# Patient Record
Sex: FEMALE | Race: WHITE | Employment: UNEMPLOYED | URBAN - METROPOLITAN AREA
[De-identification: names, ages, dates, MRNs, and addresses within clinical notes are randomized per-mention and may not be internally consistent; named-entity substitution may affect disease eponyms.]

---
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
## 2019-06-06 ENCOUNTER — OFFICE VISIT (OUTPATIENT)
Dept: FAMILY MEDICINE CLINIC | Age: 19
End: 2019-06-06

## 2019-06-06 VITALS
SYSTOLIC BLOOD PRESSURE: 128 MMHG | TEMPERATURE: 98.2 F | RESPIRATION RATE: 18 BRPM | BODY MASS INDEX: 24.62 KG/M2 | WEIGHT: 153.2 LBS | OXYGEN SATURATION: 100 % | HEART RATE: 80 BPM | HEIGHT: 66 IN | DIASTOLIC BLOOD PRESSURE: 88 MMHG

## 2019-06-06 DIAGNOSIS — F41.9 ANXIETY AND DEPRESSION: Primary | ICD-10-CM

## 2019-06-06 DIAGNOSIS — F41.0 PANIC ATTACK: ICD-10-CM

## 2019-06-06 DIAGNOSIS — M79.10 MYALGIA: ICD-10-CM

## 2019-06-06 DIAGNOSIS — F32.A ANXIETY AND DEPRESSION: Primary | ICD-10-CM

## 2019-06-06 RX ORDER — BUSPIRONE HYDROCHLORIDE 10 MG/1
10 TABLET ORAL 2 TIMES DAILY
Qty: 60 TAB | Refills: 0 | Status: SHIPPED | OUTPATIENT
Start: 2019-06-06 | End: 2019-09-24

## 2019-06-06 RX ORDER — VENLAFAXINE HYDROCHLORIDE 37.5 MG/1
37.5 CAPSULE, EXTENDED RELEASE ORAL DAILY
Qty: 30 CAP | Refills: 0 | Status: SHIPPED | OUTPATIENT
Start: 2019-06-06 | End: 2019-09-13 | Stop reason: CLARIF

## 2019-06-06 NOTE — PROGRESS NOTES
HPI  Cruz Flores is a 22 y.o. female who presents today to establish care and for anxiety. Pt notes she has been having frequent panic attacks, daily for the past week and a half. Pt states she was diagnosed with depression and anxiety at age 13 and believe it is health related at that time she was dealing with some illness and had some hospitalization with septic uterus and bleeding disorder, kidney stones and surgical procedure for that. Pt also notes relationship break ups and issues also intermittent trigger her anxiety. Pt states she was treated in the past by her pediatrician, had a bad reaction to Zoloft, she \"felt like a zombie\". Pt states she had a recent increase in anxiety that started about 2 weeks ago, which stem from wisdom tooth infection she was treated with amoxicillin and had an allergic reaction which caused nausea/vomiting and rash. PHQ 9 Score: 22 (6/6/2019  3:00 PM)  BILLY-7 Score: 21    Current Outpatient Medications   Medication Sig Dispense Refill    norgestrel-ethinyl estradiol (LOW-OGESTREL, 28,) 0.3-30 mg-mcg tab Take 1 Tab by mouth daily.  oxyCODONE-acetaminophen (PERCOCET) 5-325 mg per tablet Take 1 Tab by mouth every four (4) hours as needed for Pain. Max Daily Amount: 6 Tabs. 16 Tab 0    magnesium 250 mg tab Take 1 Tab by mouth daily.  citalopram (CELEXA) 40 mg tablet Take 40 mg by mouth daily.  multivitamin with iron tablet Take 1 Tab by mouth daily.  melatonin 3 mg tablet Take  by mouth.  acetaminophen (TYLENOL) 325 mg tablet Take 650 mg by mouth every four (4) hours as needed. Allergies   Allergen Reactions    Amoxicillin Hives    Morphine Other (comments)     flushing    Nsaids (Non-Steroidal Anti-Inflammatory Drug) Other (comments)     Denies this allergy         SUBJECTIVE:  Review of Systems   Constitutional: Negative for fever and malaise/fatigue. Eyes: Negative for blurred vision. Respiratory: Negative for shortness of breath. Cardiovascular: Negative for chest pain, palpitations and leg swelling. Gastrointestinal: Negative for abdominal pain, diarrhea, nausea and vomiting. Genitourinary: Negative for dysuria, frequency and urgency. Musculoskeletal: Positive for myalgias. Skin: Positive for itching and rash. Neurological: Negative for dizziness, tingling, sensory change and headaches. Psychiatric/Behavioral: Negative for depression and suicidal ideas. The patient is nervous/anxious and has insomnia. OBJECTIVE:  Visit Vitals  /88 (BP 1 Location: Left arm, BP Patient Position: Sitting)   Pulse 80   Temp 98.2 °F (36.8 °C) (Oral)   Resp 18   Ht 5' 6\" (1.676 m)   Wt 153 lb 3.2 oz (69.5 kg)   LMP  (LMP Unknown)   SpO2 100%   BMI 24.73 kg/m²        Physical Exam   Constitutional: She is oriented to person, place, and time and well-developed, well-nourished, and in no distress. HENT:   Head: Normocephalic. Eyes: Pupils are equal, round, and reactive to light. Conjunctivae and EOM are normal.   Neck: Normal range of motion. Neck supple. No thyromegaly present. Cardiovascular: Normal rate, regular rhythm and normal heart sounds. Pulmonary/Chest: Effort normal and breath sounds normal. She has no wheezes. She has no rales. She exhibits no tenderness. Musculoskeletal: She exhibits no edema. Neurological: She is alert and oriented to person, place, and time. Gait normal.   Skin: Skin is warm and dry. Psychiatric: Her mood appears anxious. She does not exhibit a depressed mood. She expresses no homicidal and no suicidal ideation. Nursing note and vitals reviewed. ASSESSMENT:  Diagnoses and all orders for this visit:    1. Anxiety and depression  -     REFERRAL TO PSYCHIATRY  -     venlafaxine-SR (EFFEXOR-XR) 37.5 mg capsule; Take 1 Cap by mouth daily. -     busPIRone (BUSPAR) 10 mg tablet; Take 1 Tab by mouth two (2) times a day.     2. Panic attack  -     venlafaxine-SR (EFFEXOR-XR) 37.5 mg capsule; Take 1 Cap by mouth daily. PLAN:  BILLY-7 & PHQ-9 today  Referral placed for psych  Start Buspar BID, start Effexor daily    I have discussed the diagnosis with the patient and the intended plan as seen in the above orders. The patient has received an after-visit summary and questions were answered concerning future plans. I have discussed medication side effects and warnings with the patient as well. Patient will call for further questions. Follow-up and Dispositions    · Return in about 1 month (around 7/4/2019) for follow up anxiety and myalgia.        Farhana Zavala NP

## 2019-06-06 NOTE — PROGRESS NOTES
Chief Complaint   Patient presents with    Depression    Anxiety    Establish Care     1. Have you been to the ER, urgent care clinic since your last visit? Hospitalized since your last visit? No    2. Have you seen or consulted any other health care providers outside of the [address] [place] [address] since your last visit? Include any pap smears or colon screening.  No

## 2019-06-06 NOTE — PATIENT INSTRUCTIONS

## 2019-06-07 LAB
A-G RATIO,AGRAT: 1.4 RATIO (ref 1.1–2.6)
ABSOLUTE LYMPHOCYTE COUNT, 10803: 2.6 K/UL (ref 1–4.8)
ALBUMIN SERPL-MCNC: 4.5 G/DL (ref 3.5–5)
ALP SERPL-CCNC: 82 U/L (ref 25–115)
ALT SERPL-CCNC: 61 U/L (ref 5–40)
ANION GAP SERPL CALC-SCNC: 14 MMOL/L
AST SERPL W P-5'-P-CCNC: 45 U/L (ref 10–37)
BASOPHILS # BLD: 0 K/UL (ref 0–0.2)
BASOPHILS NFR BLD: 0 % (ref 0–2)
BILIRUB SERPL-MCNC: 0.5 MG/DL (ref 0.2–1.2)
BUN SERPL-MCNC: 8 MG/DL (ref 6–22)
CALCIUM SERPL-MCNC: 9.6 MG/DL (ref 8.4–10.5)
CHLORIDE SERPL-SCNC: 103 MMOL/L (ref 98–110)
CO2 SERPL-SCNC: 23 MMOL/L (ref 20–32)
CREAT SERPL-MCNC: 0.6 MG/DL (ref 0.5–1.2)
EOSINOPHIL # BLD: 0.1 K/UL (ref 0–0.5)
EOSINOPHIL NFR BLD: 1 % (ref 0–6)
ERYTHROCYTE [DISTWIDTH] IN BLOOD BY AUTOMATED COUNT: 12.6 % (ref 10–15.5)
GFRAA, 66117: >60
GFRNA, 66118: >60
GLOBULIN,GLOB: 3.3 G/DL (ref 2–4)
GLUCOSE SERPL-MCNC: 81 MG/DL (ref 70–99)
GRANULOCYTES,GRANS: 58 % (ref 40–75)
HCT VFR BLD AUTO: 43.3 % (ref 35.1–46.5)
HGB BLD-MCNC: 13.6 G/DL (ref 11.7–15.5)
LYMPHOCYTES, LYMLT: 31 % (ref 20–45)
MCH RBC QN AUTO: 30 PG (ref 26–34)
MCHC RBC AUTO-ENTMCNC: 31 G/DL (ref 31–36)
MCV RBC AUTO: 95 FL (ref 80–95)
MONOCYTES # BLD: 0.8 K/UL (ref 0.1–1)
MONOCYTES NFR BLD: 10 % (ref 3–12)
NEUTROPHILS # BLD AUTO: 4.8 K/UL (ref 1.8–7.7)
PLATELET # BLD AUTO: 301 K/UL (ref 140–440)
PMV BLD AUTO: 11.4 FL (ref 9–13)
POTASSIUM SERPL-SCNC: 4.3 MMOL/L (ref 3.5–5.5)
PROT SERPL-MCNC: 7.8 G/DL (ref 6.4–8.3)
RBC # BLD AUTO: 4.56 M/UL (ref 3.8–5.2)
SODIUM SERPL-SCNC: 140 MMOL/L (ref 133–145)
TSH SERPL DL<=0.005 MIU/L-ACNC: 1.56 MCU/ML (ref 0.27–4.2)
WBC # BLD AUTO: 8.2 K/UL (ref 4–11)

## 2019-06-18 ENCOUNTER — OFFICE VISIT (OUTPATIENT)
Dept: FAMILY MEDICINE CLINIC | Facility: CLINIC | Age: 19
End: 2019-06-18

## 2019-06-18 VITALS
TEMPERATURE: 98.1 F | WEIGHT: 151 LBS | HEIGHT: 66 IN | RESPIRATION RATE: 20 BRPM | OXYGEN SATURATION: 99 % | HEART RATE: 60 BPM | SYSTOLIC BLOOD PRESSURE: 121 MMHG | DIASTOLIC BLOOD PRESSURE: 83 MMHG | BODY MASS INDEX: 24.27 KG/M2

## 2019-06-18 DIAGNOSIS — Z30.41 ENCOUNTER FOR SURVEILLANCE OF CONTRACEPTIVE PILLS: ICD-10-CM

## 2019-06-18 DIAGNOSIS — F41.9 ANXIETY: Primary | ICD-10-CM

## 2019-06-18 RX ORDER — DIPHENHYDRAMINE HCL 25 MG
25 CAPSULE ORAL
COMMUNITY
End: 2022-03-04 | Stop reason: ALTCHOICE

## 2019-06-18 NOTE — PROGRESS NOTES
06/18/19  9:38 AM  had concerns including Anxiety (Room 8  ). HISTORY OF PRESENT ILLNESS   This is a 22 y.o. female who presents for acute care. Her last visit with family practice was in June 6, 2019. Infected wisdom tooth  She was given Amoxil for infected wisdom tooth. She took it for 4 days. She developed a rash at that time. Anxiety   Effexor caused insomnia. Buspar increased heart palpitations. She has been on Fluoxetine Sertraline  She is scheduled for counseling and therapy. Current Outpatient Medications:     diphenhydrAMINE (BENADRYL) 25 mg capsule, Take 25 mg by mouth every six (6) hours as needed. , Disp: , Rfl:     norgestrel-ethinyl estradiol (LOW-OGESTREL, 28,) 0.3-30 mg-mcg tab, Take 1 Tab by mouth daily. , Disp: , Rfl:     multivitamin with iron tablet, Take 1 Tab by mouth daily. , Disp: , Rfl:     acetaminophen (TYLENOL) 325 mg tablet, Take 650 mg by mouth every four (4) hours as needed. , Disp: , Rfl:     venlafaxine-SR (EFFEXOR-XR) 37.5 mg capsule, Take 1 Cap by mouth daily. , Disp: 30 Cap, Rfl: 0    busPIRone (BUSPAR) 10 mg tablet, Take 1 Tab by mouth two (2) times a day., Disp: 60 Tab, Rfl: 0    oxyCODONE-acetaminophen (PERCOCET) 5-325 mg per tablet, Take 1 Tab by mouth every four (4) hours as needed for Pain. Max Daily Amount: 6 Tabs., Disp: 16 Tab, Rfl: 0    magnesium 250 mg tab, Take 1 Tab by mouth daily. , Disp: , Rfl:     melatonin 3 mg tablet, Take  by mouth., Disp: , Rfl:     Allergies   Allergen Reactions    Amoxicillin Hives    Morphine Other (comments)     flushing    Nsaids (Non-Steroidal Anti-Inflammatory Drug) Other (comments)     Denies this allergy         Active Ambulatory Problems     Diagnosis Date Noted    Von Willebrand disease ([facility] [address] 04/10/2014    Kidney stone 02/22/2017     Resolved Ambulatory Problems     Diagnosis Date Noted    Anemia due to blood loss 11/26/2012    Abnormal uterine bleeding 11/26/2012    Dermoid cyst 04/10/2014     Past Medical History:   Diagnosis Date    ADHD (attention deficit hyperactivity disorder)     Adverse effect of anesthesia 2015    Anemia     Bicornate uterus     GERD (gastroesophageal reflux disease)     Kidney stones     Pelvic mass     Unspecified adverse effect of anesthesia     Von Willebrand disease ([facility] [address]        Review of Systems   Constitutional: Negative for chills, fever, malaise/fatigue and weight loss. HENT: Negative for congestion, ear pain, hearing loss, sinus pain, sore throat and tinnitus. Eyes: Negative for blurred vision, double vision, photophobia and pain. Respiratory: Negative for cough, shortness of breath and wheezing. Cardiovascular: Negative for chest pain, palpitations and orthopnea. Gastrointestinal: Negative for abdominal pain, constipation, diarrhea, heartburn and nausea. Genitourinary: Negative for dysuria, frequency and urgency. Musculoskeletal: Negative for back pain, joint pain, myalgias and neck pain. Skin: Negative. Neurological: Negative for dizziness, sensory change, speech change, focal weakness, weakness and headaches. Endo/Heme/Allergies: Negative for polydipsia. Does not bruise/bleed easily. Psychiatric/Behavioral: Negative for depression. The patient is not nervous/anxious and does not have insomnia. Results for orders placed or performed in visit on 06/06/19   TSH 3RD GENERATION   Result Value Ref Range    TSH 1.56 0.27 - 6.16 mcU/mL   METABOLIC PANEL, COMPREHENSIVE   Result Value Ref Range    Glucose 81 70 - 99 mg/dL    BUN 8 6 - 22 mg/dL    Creatinine 0.6 0.5 - 1.2 mg/dL    Sodium 140 133 - 145 mmol/L    Potassium 4.3 3.5 - 5.5 mmol/L    Chloride 103 98 - 110 mmol/L    CO2 23 20 - 32 mmol/L    AST (SGOT) 45 (H) 10 - 37 U/L    ALT (SGPT) 61 (H) 5 - 40 U/L    Alk.  phosphatase 82 25 - 115 U/L    Bilirubin, total 0.5 0.2 - 1.2 mg/dL    Calcium 9.6 8.4 - 10.5 mg/dL    Protein, total 7.8 6.4 - 8.3 g/dL    Albumin 4.5 3.5 - 5.0 g/dL    A-G Ratio 1.4 1.1 - 2.6 ratio    Globulin 3.3 2.0 - 4.0 g/dL    Anion gap 14.0 mmol/L    GFRAA >60.0 >60.0    GFRNA >60.0 >60.0   CBC WITH AUTOMATED DIFF   Result Value Ref Range    WBC 8.2 4.0 - 11.0 K/uL    RBC 4.56 3.80 - 5.20 M/uL    HGB 13.6 11.7 - 15.5 g/dL    HCT 43.3 35.1 - 46.5 %    MCV 95 80 - 95 fL    MCH 30 26 - 34 pg    MCHC 31 31 - 36 g/dL    RDW 12.6 10.0 - 15.5 %    PLATELET 463 294 - 163 K/uL    MPV 11.4 9.0 - 13.0 fL    NEUTROPHILS 58 40 - 75 %    Lymphocytes 31 20 - 45 %    MONOCYTES 10 3 - 12 %    EOSINOPHILS 1 0 - 6 %    BASOPHILS 0 0 - 2 %    ABS. NEUTROPHILS 4.8 1.8 - 7.7 K/uL    ABSOLUTE LYMPHOCYTE COUNT 2.6 1.0 - 4.8 K/uL    ABS. MONOCYTES 0.8 0.1 - 1.0 K/uL    ABS. EOSINOPHILS 0.1 0.0 - 0.5 K/uL    ABS. BASOPHILS 0.0 0.0 - 0.2 K/uL       Visit Vitals  /83   Pulse 60   Temp 98.1 °F (36.7 °C) (Oral)   Resp 20   Ht 5' 6\" (1.676 m)   Wt 151 lb (68.5 kg)   SpO2 99%   BMI 24.37 kg/m²       Physical Exam   Constitutional: She is oriented to person, place, and time. She appears well-developed and well-nourished. HENT:   Head: Normocephalic and atraumatic. Right Ear: External ear normal.   Left Ear: External ear normal.   Mouth/Throat: Oropharynx is clear and moist.   Eyes: Pupils are equal, round, and reactive to light. Conjunctivae and EOM are normal. No scleral icterus. Neck: No JVD present. No thyromegaly present. Cardiovascular: Normal rate, regular rhythm, normal heart sounds and intact distal pulses. Pulmonary/Chest: Effort normal and breath sounds normal.   Abdominal: Bowel sounds are normal. She exhibits no distension. There is no tenderness. Musculoskeletal: Normal range of motion. She exhibits no edema or deformity. Lymphadenopathy:     She has no cervical adenopathy. Neurological: She is alert and oriented to person, place, and time. No cranial nerve deficit or sensory deficit. She exhibits normal muscle tone. Coordination normal.   Skin: Skin is dry.  Capillary refill takes less than 2 seconds. No rash noted. No erythema. No pallor. Psychiatric:   Situational anxiety noted. 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 ASSESSMENT and PLAN    ICD-10-CM ICD-9-CM    1. Anxiety F41.9 300.00     Would benefit from cognitive behavioral therapy   2. Encounter for surveillance of contraceptive pills Z30.41 V25.41 DISCONTINUED: norgestrel-ethinyl estradiol (LOW-OGESTREL, 28,) 0.3-30 mg-mcg tab    Refill given       Diagnoses and all orders for this visit:    1. Anxiety  Comments:  Would benefit from cognitive behavioral therapy    2.  Encounter for surveillance of contraceptive pills  Comments:  Refill given

## 2019-06-18 NOTE — PATIENT INSTRUCTIONS

## 2019-07-09 ENCOUNTER — OFFICE VISIT (OUTPATIENT)
Dept: FAMILY MEDICINE CLINIC | Facility: CLINIC | Age: 19
End: 2019-07-09

## 2019-07-09 VITALS
WEIGHT: 154.4 LBS | BODY MASS INDEX: 24.81 KG/M2 | SYSTOLIC BLOOD PRESSURE: 126 MMHG | HEIGHT: 66 IN | RESPIRATION RATE: 12 BRPM | DIASTOLIC BLOOD PRESSURE: 84 MMHG | HEART RATE: 69 BPM | TEMPERATURE: 97.4 F | OXYGEN SATURATION: 99 %

## 2019-07-09 DIAGNOSIS — R74.8 ELEVATED LIVER ENZYMES: Primary | ICD-10-CM

## 2019-07-09 DIAGNOSIS — F41.9 ANXIETY AND DEPRESSION: ICD-10-CM

## 2019-07-09 DIAGNOSIS — F32.A ANXIETY AND DEPRESSION: ICD-10-CM

## 2019-07-09 DIAGNOSIS — F41.0 PANIC ATTACK: ICD-10-CM

## 2019-07-09 NOTE — PROGRESS NOTES
Kimberly Tucker is a 22 y.o. female presenting today for Establish Care and Anxiety    HPI:  Kimberly Tucker presents to the office today establish care with the practice. Patient presents today noting that she had labs which show she had elevated liver enzymes. Patient also notes that she has a history of anxiety ADHD, anemia, kidney stones, GERD, von Willebrand disease and panic attacks. Patient is currently being followed by psychiatric services. She notes that she has taken Effexor and BuSpar daily. Patient also presents today without any complaints of chest pain, palpitation or lower extremity edema. She denies any cough, wheezing or congestion. Review of Systems   Respiratory: Negative for cough. Cardiovascular: Negative for chest pain and palpitations. Gastrointestinal: Negative for abdominal pain, nausea and vomiting. Neurological: Negative for dizziness and headaches. Allergies   Allergen Reactions    Amoxicillin Hives    Morphine Other (comments)     Denies allergy      Nsaids (Non-Steroidal Anti-Inflammatory Drug) Other (comments)       Current Outpatient Medications   Medication Sig Dispense Refill    LOW-OGESTREL, 28, 0.3-30 mg-mcg tab TAKE 1 TABLET BY MOUTH DAILY 84 Tab 6    diphenhydrAMINE (BENADRYL) 25 mg capsule Take 25 mg by mouth every six (6) hours as needed.  multivitamin with iron tablet Take 1 Tab by mouth daily.  acetaminophen (TYLENOL) 325 mg tablet Take 650 mg by mouth every four (4) hours as needed.  venlafaxine-SR (EFFEXOR-XR) 37.5 mg capsule Take 1 Cap by mouth daily. 30 Cap 0    busPIRone (BUSPAR) 10 mg tablet Take 1 Tab by mouth two (2) times a day. 60 Tab 0    oxyCODONE-acetaminophen (PERCOCET) 5-325 mg per tablet Take 1 Tab by mouth every four (4) hours as needed for Pain. Max Daily Amount: 6 Tabs. 16 Tab 0    magnesium 250 mg tab Take 1 Tab by mouth daily.  melatonin 3 mg tablet Take  by mouth.          Past Medical History:   Diagnosis Date    ADHD (attention deficit hyperactivity disorder)     Adverse effect of anesthesia 2015    dermoid cyst on right ovary (woke up had some hives after general anesthesia)    Anemia     Bicornate uterus     GERD (gastroesophageal reflux disease)     Kidney stones     Pelvic mass     Unspecified adverse effect of anesthesia     hives with general anethesia    Von Willebrand disease ([facility] [address]     she does not have per mother       Past Surgical History:   Procedure Laterality Date    HX COLONOSCOPY      HX LITHOTRIPSY  2015    HX OTHER SURGICAL      BIRTH SHIRA REMOVED FROM BUTTOCKS @ 6WEEKS OLD SX.    HX OTHER SURGICAL  2015    dermoid cyst removed       Social History     Socioeconomic History    Marital status: SINGLE     Spouse name: Not on file    Number of children: Not on file    Years of education: Not on file    Highest education level: Not on file   Occupational History    Not on file   Social Needs    Financial resource strain: Not on file    Food insecurity:     Worry: Not on file     Inability: Not on file    Transportation needs:     Medical: Not on file     Non-medical: Not on file   Tobacco Use    Smoking status: Current Every Day Smoker     Packs/day: 0.50    Smokeless tobacco: Never Used   Substance and Sexual Activity    Alcohol use:  Yes     Alcohol/week: 2.0 standard drinks     Types: 2 Glasses of wine per week    Drug use: Yes     Types: Marijuana    Sexual activity: Yes     Partners: Male     Birth control/protection: Condom   Lifestyle    Physical activity:     Days per week: Not on file     Minutes per session: Not on file    Stress: Not on file   Relationships    Social connections:     Talks on phone: Not on file     Gets together: Not on file     Attends Hinduism service: Not on file     Active member of club or organization: Not on file     Attends meetings of clubs or organizations: Not on file     Relationship status: Not on file    Intimate partner violence: Fear of current or ex partner: Not on file     Emotionally abused: Not on file     Physically abused: Not on file     Forced sexual activity: Not on file   Other Topics Concern    Not on file   Social History Narrative    Not on file       Patient does not have an advanced directive on file    Vitals:    07/09/19 1324   BP: 126/84   Pulse: 69   Resp: 12   Temp: 97.4 °F (36.3 °C)   TempSrc: Oral   SpO2: 99%   Weight: 154 lb 6.4 oz (70 kg)   Height: 5' 6\" (1.676 m)   PainSc:   0 - No pain       Physical Exam   Constitutional: No distress. Cardiovascular: Normal rate, regular rhythm and normal heart sounds. Pulmonary/Chest: Effort normal and breath sounds normal.   Abdominal: Soft. Bowel sounds are normal.   Neurological: She is alert. Skin: Skin is warm. Nursing note and vitals reviewed. Office Visit on 07/09/2019   Component Date Value Ref Range Status    Glucose 07/09/2019 75  70 - 99 mg/dL Final    BUN 07/09/2019 10  6 - 22 mg/dL Final    Creatinine 07/09/2019 0.6  0.5 - 1.2 mg/dL Final    Sodium 07/09/2019 140  133 - 145 mmol/L Final    Potassium 07/09/2019 4.4  3.5 - 5.5 mmol/L Final    Chloride 07/09/2019 102  98 - 110 mmol/L Final    CO2 07/09/2019 26  20 - 32 mmol/L Final    AST (SGOT) 07/09/2019 21  10 - 37 U/L Final    ALT (SGPT) 07/09/2019 21  5 - 40 U/L Final    Alk. phosphatase 07/09/2019 73  25 - 115 U/L Final    Bilirubin, total 07/09/2019 0.5  0.2 - 1.2 mg/dL Final    Calcium 07/09/2019 9.6  8.4 - 10.5 mg/dL Final    Protein, total 07/09/2019 7.5  6.4 - 8.3 g/dL Final    Albumin 07/09/2019 4.4  3.5 - 5.0 g/dL Final    A-G Ratio 07/09/2019 1.4  1.1 - 2.6 ratio Final    Globulin 07/09/2019 3.1  2.0 - 4.0 g/dL Final    Anion gap 07/09/2019 12.0  mmol/L Final    Comment: Test includes Albumin, Alkaline Phosphatase, ALT, AST, BUN, Calcium, CO2,  Chloride, Creatinine, Glucose, Potassium, Sodium, Total Bilirubin and Total  Protein.   Estimated GFR results are reported in mL/min/1.73 sq.m. by the MDRD equation. This eGFR is validated for stable chronic renal failure patients. This   equation  is unreliable in acute illness or patients with normal renal function.  GFRAA 07/09/2019 >60.0  >60.0 Final    GFRNA 07/09/2019 >60.0  >60.0 Final   Office Visit on 06/06/2019   Component Date Value Ref Range Status    TSH 06/06/2019 1.56  0.27 - 4.20 mcU/mL Final    Glucose 06/06/2019 81  70 - 99 mg/dL Final    BUN 06/06/2019 8  6 - 22 mg/dL Final    Creatinine 06/06/2019 0.6  0.5 - 1.2 mg/dL Final    Sodium 06/06/2019 140  133 - 145 mmol/L Final    Potassium 06/06/2019 4.3  3.5 - 5.5 mmol/L Final    Chloride 06/06/2019 103  98 - 110 mmol/L Final    CO2 06/06/2019 23  20 - 32 mmol/L Final    AST (SGOT) 06/06/2019 45* 10 - 37 U/L Final    ALT (SGPT) 06/06/2019 61* 5 - 40 U/L Final    Alk. phosphatase 06/06/2019 82  25 - 115 U/L Final    Bilirubin, total 06/06/2019 0.5  0.2 - 1.2 mg/dL Final    Calcium 06/06/2019 9.6  8.4 - 10.5 mg/dL Final    Protein, total 06/06/2019 7.8  6.4 - 8.3 g/dL Final    Albumin 06/06/2019 4.5  3.5 - 5.0 g/dL Final    A-G Ratio 06/06/2019 1.4  1.1 - 2.6 ratio Final    Globulin 06/06/2019 3.3  2.0 - 4.0 g/dL Final    Anion gap 06/06/2019 14.0  mmol/L Final    Comment: Test includes Albumin, Alkaline Phosphatase, ALT, AST, BUN, Calcium, CO2,  Chloride, Creatinine, Glucose, Potassium, Sodium, Total Bilirubin and Total  Protein. Estimated GFR results are reported in mL/min/1.73 sq.m. by the MDRD equation. This eGFR is validated for stable chronic renal failure patients. This   equation  is unreliable in acute illness or patients with normal renal function.       GFRAA 06/06/2019 >60.0  >60.0 Final    GFRNA 06/06/2019 >60.0  >60.0 Final    WBC 06/06/2019 8.2  4.0 - 11.0 K/uL Final    RBC 06/06/2019 4.56  3.80 - 5.20 M/uL Final    HGB 06/06/2019 13.6  11.7 - 15.5 g/dL Final    HCT 06/06/2019 43.3  35.1 - 46.5 % Final    MCV 06/06/2019 95  80 - 95 fL Final    MCH 06/06/2019 30  26 - 34 pg Final    MCHC 06/06/2019 31  31 - 36 g/dL Final    RDW 06/06/2019 12.6  10.0 - 15.5 % Final    PLATELET 40/71/3403 388  140 - 440 K/uL Final    MPV 06/06/2019 11.4  9.0 - 13.0 fL Final    NEUTROPHILS 06/06/2019 58  40 - 75 % Final    Lymphocytes 06/06/2019 31  20 - 45 % Final    MONOCYTES 06/06/2019 10  3 - 12 % Final    EOSINOPHILS 06/06/2019 1  0 - 6 % Final    BASOPHILS 06/06/2019 0  0 - 2 % Final    ABS. NEUTROPHILS 06/06/2019 4.8  1.8 - 7.7 K/uL Final    ABSOLUTE LYMPHOCYTE COUNT 06/06/2019 2.6  1.0 - 4.8 K/uL Final    ABS. MONOCYTES 06/06/2019 0.8  0.1 - 1.0 K/uL Final    ABS. EOSINOPHILS 06/06/2019 0.1  0.0 - 0.5 K/uL Final    ABS. BASOPHILS 06/06/2019 0.0  0.0 - 0.2 K/uL Final       .  Results for orders placed or performed in visit on 96/60/89   METABOLIC PANEL, COMPREHENSIVE   Result Value Ref Range    Glucose 75 70 - 99 mg/dL    BUN 10 6 - 22 mg/dL    Creatinine 0.6 0.5 - 1.2 mg/dL    Sodium 140 133 - 145 mmol/L    Potassium 4.4 3.5 - 5.5 mmol/L    Chloride 102 98 - 110 mmol/L    CO2 26 20 - 32 mmol/L    AST (SGOT) 21 10 - 37 U/L    ALT (SGPT) 21 5 - 40 U/L    Alk. phosphatase 73 25 - 115 U/L    Bilirubin, total 0.5 0.2 - 1.2 mg/dL    Calcium 9.6 8.4 - 10.5 mg/dL    Protein, total 7.5 6.4 - 8.3 g/dL    Albumin 4.4 3.5 - 5.0 g/dL    A-G Ratio 1.4 1.1 - 2.6 ratio    Globulin 3.1 2.0 - 4.0 g/dL    Anion gap 12.0 mmol/L    GFRAA >60.0 >60.0    GFRNA >60.0 >60.0    Narrative    Unless additionally indicated, test performed at: [facility], [address] [place] [address]. PH: [phone]. Assessment / Plan:      ICD-10-CM ICD-9-CM    1. Elevated liver enzymes H59.0 093.8 METABOLIC PANEL, COMPREHENSIVE      METABOLIC PANEL, COMPREHENSIVE   2. Panic attack F41.0 300.01    3.  Anxiety and depression F41.9 300.00     N92.6 889      Metabolic panel ordered for elevated liver enzymes      I asked the patient if she  had any questions and answered her  questions. The patient stated that she understands the treatment plan and agrees with the treatment plan    This document was created with a voice activated dictation system and may contain transcription errors.

## 2019-07-09 NOTE — PROGRESS NOTES
ROOM # 2    Tavo Maldonado presents today for   Chief Complaint   Patient presents with    Establish Care    Anxiety       Tavo Maldonado preferred language for health care discussion is english/other. Is someone accompanying this pt? no    Is the patient using any DME equipment during [address]? no    Depression Screening:  3 most recent PHQ Screens 6/18/2019 6/6/2019   Little interest or pleasure in doing things More than half the days More than half the days   Feeling down, depressed, irritable, or hopeless Several days Nearly every day   Total Score PHQ 2 3 5   Trouble falling or staying asleep, or sleeping too much - Nearly every day   Feeling tired or having little energy - Nearly every day   Poor appetite, weight loss, or overeating - More than half the days   Feeling bad about yourself - or that you are a failure or have let yourself or your family down - More than half the days   Trouble concentrating on things such as school, work, reading, or watching TV - Nearly every day   Moving or speaking so slowly that other people could have noticed; or the opposite being so fidgety that others notice - Nearly every day   Thoughts of being better off dead, or hurting yourself in some way - Several days   PHQ 9 Score - 22   How difficult have these problems made it for you to do your work, take care of your home and get along with others - Very difficult       Learning Assessment:  Learning Assessment 6/6/2019   PRIMARY LEARNER Patient   HIGHEST LEVEL OF EDUCATION - PRIMARY LEARNER  SOME COLLEGE   BARRIERS PRIMARY LEARNER NONE   PRIMARY LANGUAGE ENGLISH   LEARNER PREFERENCE PRIMARY DEMONSTRATION   ANSWERED BY self   RELATIONSHIP SELF       Abuse Screening:  No flowsheet data found. Fall Risk  No flowsheet data found. Health Maintenance reviewed and discussed per provider.  Yes    Tavo Maldonado is due for   Health Maintenance Due   Topic Date Due    Pneumococcal 0-64 years (1 of 1 - PPSV23) 05/27/2004    HPV Age 9Y-26Y (1 - Female 3-dose series) 05/27/2013    DTaP/Tdap/Td series (1 - Tdap) 05/27/2019    PAP AKA CERVICAL CYTOLOGY  05/27/2019         Please order/place referral if appropriate. Advance Directive:  1. Do you have an advance directive in place? Patient Reply: no    2. If not, would you like material regarding how to put one in place? Patient Reply: no    Coordination of Care:  1. Have you been to the ER, urgent care clinic since your last visit? Hospitalized since your last visit? no    2. Have you seen or consulted any other health care providers outside of the [address] [place] [address] since your last visit? Include any pap smears or colon screening.  no

## 2019-07-10 LAB
A-G RATIO,AGRAT: 1.4 RATIO (ref 1.1–2.6)
ALBUMIN SERPL-MCNC: 4.4 G/DL (ref 3.5–5)
ALP SERPL-CCNC: 73 U/L (ref 25–115)
ALT SERPL-CCNC: 21 U/L (ref 5–40)
ANION GAP SERPL CALC-SCNC: 12 MMOL/L
AST SERPL W P-5'-P-CCNC: 21 U/L (ref 10–37)
BILIRUB SERPL-MCNC: 0.5 MG/DL (ref 0.2–1.2)
BUN SERPL-MCNC: 10 MG/DL (ref 6–22)
CALCIUM SERPL-MCNC: 9.6 MG/DL (ref 8.4–10.5)
CHLORIDE SERPL-SCNC: 102 MMOL/L (ref 98–110)
CO2 SERPL-SCNC: 26 MMOL/L (ref 20–32)
CREAT SERPL-MCNC: 0.6 MG/DL (ref 0.5–1.2)
GFRAA, 66117: >60
GFRNA, 66118: >60
GLOBULIN,GLOB: 3.1 G/DL (ref 2–4)
GLUCOSE SERPL-MCNC: 75 MG/DL (ref 70–99)
POTASSIUM SERPL-SCNC: 4.4 MMOL/L (ref 3.5–5.5)
PROT SERPL-MCNC: 7.5 G/DL (ref 6.4–8.3)
SODIUM SERPL-SCNC: 140 MMOL/L (ref 133–145)

## 2019-09-13 ENCOUNTER — OFFICE VISIT (OUTPATIENT)
Dept: FAMILY MEDICINE CLINIC | Facility: CLINIC | Age: 19
End: 2019-09-13

## 2019-09-13 VITALS
WEIGHT: 151 LBS | TEMPERATURE: 98.9 F | SYSTOLIC BLOOD PRESSURE: 137 MMHG | RESPIRATION RATE: 12 BRPM | DIASTOLIC BLOOD PRESSURE: 99 MMHG | HEIGHT: 66 IN | OXYGEN SATURATION: 100 % | BODY MASS INDEX: 24.27 KG/M2 | HEART RATE: 95 BPM

## 2019-09-13 DIAGNOSIS — R00.2 PALPITATIONS: ICD-10-CM

## 2019-09-13 DIAGNOSIS — F41.9 ANXIETY: ICD-10-CM

## 2019-09-13 DIAGNOSIS — F41.9 ANXIETY: Primary | ICD-10-CM

## 2019-09-13 DIAGNOSIS — Z23 ENCOUNTER FOR IMMUNIZATION: ICD-10-CM

## 2019-09-13 RX ORDER — SERTRALINE HYDROCHLORIDE 50 MG/1
50 TABLET, FILM COATED ORAL DAILY
Qty: 30 TAB | Refills: 2 | Status: SHIPPED | OUTPATIENT
Start: 2019-09-13 | End: 2019-09-13 | Stop reason: SDUPTHER

## 2019-09-13 RX ORDER — PROPRANOLOL HYDROCHLORIDE 10 MG/1
10 TABLET ORAL 2 TIMES DAILY
Qty: 60 TAB | Refills: 1 | Status: SHIPPED | OUTPATIENT
Start: 2019-09-13 | End: 2019-09-13 | Stop reason: SDUPTHER

## 2019-09-13 RX ORDER — SERTRALINE HYDROCHLORIDE 50 MG/1
TABLET, FILM COATED ORAL
Qty: 90 TAB | Refills: 2 | Status: SHIPPED | OUTPATIENT
Start: 2019-09-13 | End: 2019-10-08 | Stop reason: DRUGHIGH

## 2019-09-13 RX ORDER — SERTRALINE HYDROCHLORIDE 25 MG/1
TABLET, FILM COATED ORAL DAILY
COMMUNITY
End: 2019-09-13 | Stop reason: DRUGHIGH

## 2019-09-13 RX ORDER — PROPRANOLOL HYDROCHLORIDE 10 MG/1
TABLET ORAL
Qty: 180 TAB | Refills: 1 | Status: SHIPPED | OUTPATIENT
Start: 2019-09-13 | End: 2019-09-24 | Stop reason: DRUGHIGH

## 2019-09-13 NOTE — PROGRESS NOTES
Montserrat George presents today for   Chief Complaint   Patient presents with    Follow-up     patient stated that she been having panic attacks at least once a day    Chest Pain     tightness in the chest and numbness in arm statrted  August       Is someone accompanying this pt? no    Is the patient using any DME equipment during [address]? no    Depression Screening:  3 most recent PHQ Screens 9/13/2019   Little interest or pleasure in doing things Several days   Feeling down, depressed, irritable, or hopeless More than half the days   Total Score PHQ 2 3   Trouble falling or staying asleep, or sleeping too much Nearly every day   Feeling tired or having little energy More than half the days   Poor appetite, weight loss, or overeating More than half the days   Feeling bad about yourself - or that you are a failure or have let yourself or your family down Not at all   Trouble concentrating on things such as school, work, reading, or watching TV More than half the days   Moving or speaking so slowly that other people could have noticed; or the opposite being so fidgety that others notice Not at all   Thoughts of being better off dead, or hurting yourself in some way Not at all   PHQ 9 Score 12   How difficult have these problems made it for you to do your work, take care of your home and get along with others Somewhat difficult       Learning Assessment:  Learning Assessment 6/6/2019   PRIMARY LEARNER Patient   HIGHEST LEVEL OF EDUCATION - PRIMARY LEARNER  SOME COLLEGE   BARRIERS PRIMARY LEARNER NONE   PRIMARY LANGUAGE ENGLISH   LEARNER PREFERENCE PRIMARY DEMONSTRATION   ANSWERED BY self   RELATIONSHIP SELF       Abuse Screening:  No flowsheet data found. Fall Risk  No flowsheet data found. Health Maintenance reviewed and discussed and ordered per Provider.     Health Maintenance Due   Topic Date Due    Pneumococcal 0-64 years (1 of 1 - PPSV23) 05/27/2004    HPV Age 9Y-34Y (1 - Female 3-dose series) 05/27/2013    DTaP/Tdap/Td series (1 - Tdap) 05/27/2019    PAP AKA CERVICAL CYTOLOGY  05/27/2019    Influenza Age 9 to Adult  08/01/2019           Coordination of Care:  1. Have you been to the ER, urgent care clinic since your last visit? Hospitalized since your last visit? no    2. Have you seen or consulted any other health care providers outside of the [address] [place] [address] since your last visit? Include any pap smears or colon screening.  no

## 2019-09-13 NOTE — PROGRESS NOTES
Joanna Torres is a 22 y.o. female presenting today for Follow-up (patient stated that she been having panic attacks at least once a day) and Chest Pain (tightness in the chest and numbness in arm statrted  August)    HPI:  Joanna Torres presents to the office today for anxiety follow-up care. Patient notes over the last month she has been having palpations. She reports that she thinks her palpitations associated anxiety. He notes that she has been having panic attacks at least once a day. She notes that with the panic attacks he has chest tightness with numbness to the arm. She presents today with a blood pressure elevated 137/98. She denies any chest pain at this time. Review of Systems   Respiratory: Negative for cough and shortness of breath. Cardiovascular: Positive for palpitations. Negative for chest pain and leg swelling. Psychiatric/Behavioral: The patient is nervous/anxious. Allergies   Allergen Reactions    Amoxicillin Hives    Morphine Other (comments)     Denies allergy      Nsaids (Non-Steroidal Anti-Inflammatory Drug) Other (comments)       Current Outpatient Medications   Medication Sig Dispense Refill    LOW-OGESTREL, 28, 0.3-30 mg-mcg tab TAKE 1 TABLET BY MOUTH DAILY 84 Tab 6    diphenhydrAMINE (BENADRYL) 25 mg capsule Take 25 mg by mouth every six (6) hours as needed.  multivitamin with iron tablet Take 1 Tab by mouth daily.  propranolol LA (INDERAL LA) 60 mg SR capsule Take 1 Cap by mouth daily. 90 Cap 2    sertraline (ZOLOFT) 50 mg tablet TAKE 1 TABLET BY MOUTH DAILY 90 Tab 2    acetaminophen (TYLENOL) 325 mg tablet Take 650 mg by mouth every four (4) hours as needed.            Past Medical History:   Diagnosis Date    ADHD (attention deficit hyperactivity disorder)     Adverse effect of anesthesia 2015    dermoid cyst on right ovary (woke up had some hives after general anesthesia)    Anemia     Bicornate uterus     GERD (gastroesophageal reflux disease)  Kidney stones     Pelvic mass     Unspecified adverse effect of anesthesia     hives with general anethesia    Von Willebrand disease ([address]     she does not have per mother       Past Surgical History:   Procedure Laterality Date    HX COLONOSCOPY      HX LITHOTRIPSY  2015    HX OTHER SURGICAL      BIRTH SHIRA REMOVED FROM BUTTOCKS @ 6WEEKS OLD SX.    HX OTHER SURGICAL  2015    dermoid cyst removed       Social History     Socioeconomic History    Marital status: SINGLE     Spouse name: Not on file    Number of children: Not on file    Years of education: Not on file    Highest education level: Not on file   Occupational History    Not on file   Social Needs    Financial resource strain: Not on file    Food insecurity:     Worry: Not on file     Inability: Not on file    Transportation needs:     Medical: Not on file     Non-medical: Not on file   Tobacco Use    Smoking status: Former Smoker     Packs/day: 0.50     Last attempt to quit: 2019     Years since quittin.0    Smokeless tobacco: Current User    Tobacco comment: vape   Substance and Sexual Activity    Alcohol use:  Yes     Alcohol/week: 2.0 standard drinks     Types: 2 Glasses of wine per week    Drug use: Yes     Types: Marijuana    Sexual activity: Yes     Partners: Male     Birth control/protection: Condom   Lifestyle    Physical activity:     Days per week: Not on file     Minutes per session: Not on file    Stress: Not on file   Relationships    Social connections:     Talks on phone: Not on file     Gets together: Not on file     Attends Hinduism service: Not on file     Active member of club or organization: Not on file     Attends meetings of clubs or organizations: Not on file     Relationship status: Not on file    Intimate partner violence:     Fear of current or ex partner: Not on file     Emotionally abused: Not on file     Physically abused: Not on file     Forced sexual activity: Not on file   Other Topics Concern    Not on file   Social History Narrative    Not on file       Patient does not have an advanced directive on file    Vitals:    09/13/19 1448   BP: (!) 137/99   Pulse: 95   Resp: 12   Temp: 98.9 °F (37.2 °C)   TempSrc: Oral   SpO2: 100%   Weight: 151 lb (68.5 kg)   Height: 5' 6\" (1.676 m)   PainSc:   0 - No pain       Physical Exam   Constitutional: She is oriented to person, place, and time. No distress. Cardiovascular: Normal rate, regular rhythm and normal heart sounds. Pulmonary/Chest: Effort normal and breath sounds normal.   Neurological: She is alert and oriented to person, place, and time. Nursing note and vitals reviewed. Office Visit on 07/09/2019   Component Date Value Ref Range Status    Glucose 07/09/2019 75  70 - 99 mg/dL Final    BUN 07/09/2019 10  6 - 22 mg/dL Final    Creatinine 07/09/2019 0.6  0.5 - 1.2 mg/dL Final    Sodium 07/09/2019 140  133 - 145 mmol/L Final    Potassium 07/09/2019 4.4  3.5 - 5.5 mmol/L Final    Chloride 07/09/2019 102  98 - 110 mmol/L Final    CO2 07/09/2019 26  20 - 32 mmol/L Final    AST (SGOT) 07/09/2019 21  10 - 37 U/L Final    ALT (SGPT) 07/09/2019 21  5 - 40 U/L Final    Alk. phosphatase 07/09/2019 73  25 - 115 U/L Final    Bilirubin, total 07/09/2019 0.5  0.2 - 1.2 mg/dL Final    Calcium 07/09/2019 9.6  8.4 - 10.5 mg/dL Final    Protein, total 07/09/2019 7.5  6.4 - 8.3 g/dL Final    Albumin 07/09/2019 4.4  3.5 - 5.0 g/dL Final    A-G Ratio 07/09/2019 1.4  1.1 - 2.6 ratio Final    Globulin 07/09/2019 3.1  2.0 - 4.0 g/dL Final    Anion gap 07/09/2019 12.0  mmol/L Final    Comment: Test includes Albumin, Alkaline Phosphatase, ALT, AST, BUN, Calcium, CO2,  Chloride, Creatinine, Glucose, Potassium, Sodium, Total Bilirubin and Total  Protein. Estimated GFR results are reported in mL/min/1.73 sq.m. by the MDRD equation. This eGFR is validated for stable chronic renal failure patients.  This   equation  is unreliable in acute illness or patients with normal renal function.  GFRAA 07/09/2019 >60.0  >60.0 Final    GFRNA 07/09/2019 >60.0  >60.0 Final       .No results found for any visits on 09/13/19. Assessment / Plan:      ICD-10-CM ICD-9-CM    1. Anxiety F41.9 300.00 DISCONTINUED: sertraline (ZOLOFT) 50 mg tablet   2. Palpitations R00.2 785.1 AMB POC EKG ROUTINE W/ 12 LEADS, INTER & REP      DISCONTINUED: propranolol (INDERAL) 10 mg tablet   3. Encounter for immunization Z23 V03.89 INFLUENZA VIRUS VAC QUAD,SPLIT,PRESV FREE SYRINGE IM     EKG negative  Propranolol given  Follow-up in 1 month      Follow-up and Dispositions    · Return in about 4 weeks (around 10/11/2019), or if symptoms worsen or fail to improve. I asked the patient if she  had any questions and answered her  questions. The patient stated that she understands the treatment plan and agrees with the treatment plan    This document was created with a voice activated dictation system and may contain transcription errors.

## 2019-09-24 ENCOUNTER — OFFICE VISIT (OUTPATIENT)
Dept: CARDIOLOGY CLINIC | Age: 19
End: 2019-09-24

## 2019-09-24 VITALS
BODY MASS INDEX: 24.27 KG/M2 | DIASTOLIC BLOOD PRESSURE: 80 MMHG | HEIGHT: 66 IN | SYSTOLIC BLOOD PRESSURE: 130 MMHG | WEIGHT: 151 LBS | HEART RATE: 75 BPM

## 2019-09-24 DIAGNOSIS — R00.2 PALPITATION: Primary | ICD-10-CM

## 2019-09-24 DIAGNOSIS — F41.9 ANXIETY: ICD-10-CM

## 2019-09-24 DIAGNOSIS — R07.89 CHEST TIGHTNESS: ICD-10-CM

## 2019-09-24 DIAGNOSIS — Z72.0 TOBACCO ABUSE: ICD-10-CM

## 2019-09-24 DIAGNOSIS — R00.0 FAST HEART BEAT: ICD-10-CM

## 2019-09-24 RX ORDER — PROPRANOLOL HYDROCHLORIDE 60 MG/1
60 CAPSULE, EXTENDED RELEASE ORAL DAILY
Qty: 90 CAP | Refills: 2 | Status: SHIPPED | OUTPATIENT
Start: 2019-09-24 | End: 2019-10-29

## 2019-09-24 NOTE — PROGRESS NOTES
HISTORY OF PRESENT ILLNESS  Sri Kumar is a 22 y.o. female. Ms. Joyce Harrison presents with c/o palpitations. She reports palpitations and fast heart rate have been intermittent over the past month. She feels the sensation primarily when laying down, not with activity. New Patient   The history is provided by the patient and medical records. This is a recurrent problem. The current episode started more than 1 week ago. The problem occurs daily. The problem has been gradually worsening. Pertinent negatives include no chest pain and no shortness of breath. Nothing aggravates the symptoms. Nothing relieves the symptoms. Palpitations    The history is provided by the patient and medical records. This is a recurrent problem. The current episode started more than 1 week ago. The problem has been gradually worsening. The problem occurs daily. The problem is associated with anxiety, nicotine and caffeine. Pertinent negatives include no malaise/fatigue, no chest pain, no claudication, no orthopnea, no PND, no nausea, no vomiting, no dizziness, no cough and no shortness of breath.      Family History   Problem Relation Age of Onset    Heart Disease Maternal Grandfather     Heart Disease Paternal Grandmother     Diabetes Paternal Grandfather     No Known Problems Mother     No Known Problems Father     Heart Attack Paternal Uncle        Past Medical History:   Diagnosis Date    ADHD (attention deficit hyperactivity disorder)     Adverse effect of anesthesia 2015    dermoid cyst on right ovary (woke up had some hives after general anesthesia)    Anemia     Bicornate uterus     GERD (gastroesophageal reflux disease)     Kidney stones     Pelvic mass     Unspecified adverse effect of anesthesia     hives with general anethesia    Von Willebrand disease ([address]     she does not have per mother       Past Surgical History:   Procedure Laterality Date    HX COLONOSCOPY      HX LITHOTRIPSY  2015    HX OTHER SURGICAL BIRTH SHIRA REMOVED FROM BUTTOCKS @ 6WEEKS OLD SX.    HX OTHER SURGICAL  2015    dermoid cyst removed       Social History     Tobacco Use    Smoking status: Former Smoker     Packs/day: 0.50     Last attempt to quit: 2019     Years since quittin.0    Smokeless tobacco: Current User    Tobacco comment: vape   Substance Use Topics    Alcohol use: Yes     Alcohol/week: 2.0 standard drinks     Types: 2 Glasses of wine per week       Allergies   Allergen Reactions    Amoxicillin Hives    Morphine Other (comments)     Denies allergy      Nsaids (Non-Steroidal Anti-Inflammatory Drug) Other (comments)       Prior to Admission medications    Medication Sig Start Date End Date Taking? Authorizing Provider   propranolol LA (INDERAL LA) 60 mg SR capsule Take 1 Cap by mouth daily. 19  Yes Steve Velasco NP   sertraline (ZOLOFT) 50 mg tablet TAKE 1 TABLET BY MOUTH DAILY 19  Yes Bartolo BOYD NP   LOW-OGESTREL, 28, 0.3-30 mg-mcg tab TAKE 1 TABLET BY MOUTH DAILY 19  Yes Rhianna Bray MD   diphenhydrAMINE (BENADRYL) 25 mg capsule Take 25 mg by mouth every six (6) hours as needed. Yes Provider, Historical   multivitamin with iron tablet Take 1 Tab by mouth daily. Yes Provider, Historical   acetaminophen (TYLENOL) 325 mg tablet Take 650 mg by mouth every four (4) hours as needed. Yes Arcenio Gregory MD         Visit Vitals  /80   Pulse 75   Ht 5' 6\" (1.676 m)   Wt 68.5 kg (151 lb)   BMI 24.37 kg/m²       Review of Systems   Constitutional: Negative for malaise/fatigue. Respiratory: Negative for cough, shortness of breath and wheezing. Cardiovascular: Negative for chest pain, palpitations, orthopnea, claudication, leg swelling and PND. Gastrointestinal: Negative for nausea and vomiting. Musculoskeletal: Negative for falls. Neurological: Negative for dizziness. Endo/Heme/Allergies: Does not bruise/bleed easily.        Physical Exam   Constitutional: She is oriented to person, place, and time. She appears well-developed and well-nourished. Neck: No JVD present. Cardiovascular: Normal rate, regular rhythm, normal heart sounds and intact distal pulses. Exam reveals no gallop and no friction rub. No murmur heard. Pulmonary/Chest: Effort normal and breath sounds normal. No respiratory distress. She has no wheezes. She has no rales. She exhibits no tenderness. Abdominal: Soft. She exhibits no distension and no mass. There is no tenderness. Musculoskeletal: Normal range of motion. She exhibits no edema. Neurological: She is alert and oriented to person, place, and time. Skin: Skin is warm and dry. Psychiatric: She has a normal mood and affect. Nursing note and vitals reviewed. I have personally reviewed patients ekg done at other facility. I Have personally reviewed recent relevant labs available and discussed with patient    ASSESSMENT and PLAN     Ms. Daniel Payne has a reminder for a \"due or due soon\" health maintenance. I have asked that she contact her primary care provider for follow-up on this health maintenance. No flowsheet data found. Assessment         ICD-10-CM ICD-9-CM    1. Palpitation R00.2 785.1 CARDIAC EVENT MONITOR      propranolol LA (INDERAL LA) 60 mg SR capsule    Event monitor to evaluate for arrhythmias    2. Chest tightness R07.89 786.59     With fast heart rate - monitor   3. Anxiety F41.9 300.00     Continue Zoloft - follow with PCP   4. Fast heart beat R00.0 785. 0     Change to Propanolol LA 60 mg    5. Tobacco abuse Z72.0 305.1     Encouraged Vape cessation     9/2019 Ms. Daniel Payne c/o palpitations with fast heart rate and occasional chest tightness. Symptoms have been intermittent over the past month. Recent labs reviewed, TSH normal, EKG SR. Will obtain event monitor to r/o arrhythmias. Encouraged smoking cessation, decrease caffeine intake and limit alcohol to no more than 1 drink / day.   Palpitations are likely due to situational anxiety. Will d/c propranolol and begin long acting Propranolol 60 mg/ day. Discussed relaxation techniques such as yoga or exercise. Medications Discontinued During This Encounter   Medication Reason    magnesium 250 mg tab Therapy Completed    busPIRone (BUSPAR) 10 mg tablet     oxyCODONE-acetaminophen (PERCOCET) 5-325 mg per tablet     melatonin 3 mg tablet     propranolol (INDERAL) 10 mg tablet Dose Adjustment       Orders Placed This Encounter    propranolol LA (INDERAL LA) 60 mg SR capsule     Sig: Take 1 Cap by mouth daily. Dispense:  90 Cap     Refill:  2       Follow-up and Dispositions    · Return in about 6 weeks (around 11/5/2019). I have independently evaluated taken history and examined the patient. All relevant labs and testing data's are reviewed. Care plan discussed and updated after review.   Jacob Lewis MD

## 2019-09-24 NOTE — PATIENT INSTRUCTIONS

## 2019-10-08 ENCOUNTER — OFFICE VISIT (OUTPATIENT)
Dept: FAMILY MEDICINE CLINIC | Facility: CLINIC | Age: 19
End: 2019-10-08

## 2019-10-08 VITALS
BODY MASS INDEX: 23.95 KG/M2 | OXYGEN SATURATION: 99 % | SYSTOLIC BLOOD PRESSURE: 120 MMHG | TEMPERATURE: 98 F | HEART RATE: 73 BPM | DIASTOLIC BLOOD PRESSURE: 80 MMHG | RESPIRATION RATE: 12 BRPM | HEIGHT: 66 IN | WEIGHT: 149 LBS

## 2019-10-08 DIAGNOSIS — F41.9 ANXIETY: ICD-10-CM

## 2019-10-08 DIAGNOSIS — R00.2 PALPITATIONS: Primary | ICD-10-CM

## 2019-10-08 RX ORDER — SERTRALINE HYDROCHLORIDE 50 MG/1
TABLET, FILM COATED ORAL
Qty: 60 TAB | Refills: 2 | Status: SHIPPED | OUTPATIENT
Start: 2019-10-08 | End: 2020-05-05 | Stop reason: DRUGHIGH

## 2019-10-08 NOTE — PROGRESS NOTES
Visit Vitals  /86   Pulse 73   Temp 98 °F (36.7 °C) (Oral)   Resp 12   Ht 5' 6\" (1.676 m)   Wt 149 lb (67.6 kg)   SpO2 99%   BMI 24.05 kg/m²     Elie Martin presents today for   Chief Complaint   Patient presents with    Medication Evaluation     follow-up       Is someone accompanying this pt? no    Is the patient using any DME equipment during [facility]? no    Depression Screening:  3 most recent PHQ Screens 10/8/2019   Little interest or pleasure in doing things Not at all   Feeling down, depressed, irritable, or hopeless Not at all   Total Score PHQ 2 0   Trouble falling or staying asleep, or sleeping too much -   Feeling tired or having little energy -   Poor appetite, weight loss, or overeating -   Feeling bad about yourself - or that you are a failure or have let yourself or your family down -   Trouble concentrating on things such as school, work, reading, or watching TV -   Moving or speaking so slowly that other people could have noticed; or the opposite being so fidgety that others notice -   Thoughts of being better off dead, or hurting yourself in some way -   PHQ 9 Score -   How difficult have these problems made it for you to do your work, take care of your home and get along with others -       Learning Assessment:  Learning Assessment 6/6/2019   PRIMARY LEARNER Patient   HIGHEST LEVEL OF EDUCATION - PRIMARY LEARNER  SOME COLLEGE   BARRIERS PRIMARY LEARNER NONE   PRIMARY LANGUAGE ENGLISH   LEARNER PREFERENCE PRIMARY DEMONSTRATION   ANSWERED BY self   RELATIONSHIP SELF       Abuse Screening:  No flowsheet data found. Fall Risk  No flowsheet data found. Health Maintenance reviewed and discussed and ordered per Provider. Health Maintenance Due   Topic Date Due    HPV Age 9Y-34Y (1 - Female 3-dose series) 05/27/2013    DTaP/Tdap/Td series (1 - Tdap) 05/27/2019    PAP AKA CERVICAL CYTOLOGY  05/27/2019           Coordination of Care:  1.  Have you been to the ER, urgent care clinic since your last visit? Hospitalized since your last visit? no    2. Have you seen or consulted any other health care providers outside of the [address] [place] [address] since your last visit? Include any pap smears or colon screening.  no

## 2019-10-08 NOTE — PROGRESS NOTES
Ondina Azevedo is a 22 y.o. female presenting today for Medication Evaluation (follow-up)    HPI:  Ondina Azevedo presents to the office today for palpation follow-up care. Patient presents today to the practice after starting propanolol for palpitations. She was seen by Cardiology on September 24, 2019 and her propanolol was increased to 60 mg XR and a holter monitor was ordered. She notes she continues to have palpitations daily but they have improved with the increase of medication. Review of Systems   Respiratory: Negative for cough and sputum production. Cardiovascular: Positive for palpitations. Negative for chest pain and leg swelling. Allergies   Allergen Reactions    Amoxicillin Hives    Morphine Other (comments)     Denies allergy      Nsaids (Non-Steroidal Anti-Inflammatory Drug) Other (comments)       Current Outpatient Medications   Medication Sig Dispense Refill    sertraline (ZOLOFT) 50 mg tablet Take 1 1/2 tablets by mouth daily 60 Tab 2    propranolol LA (INDERAL LA) 60 mg SR capsule Take 1 Cap by mouth daily. 90 Cap 2    LOW-OGESTREL, 28, 0.3-30 mg-mcg tab TAKE 1 TABLET BY MOUTH DAILY 84 Tab 6    multivitamin with iron tablet Take 1 Tab by mouth daily.  diphenhydrAMINE (BENADRYL) 25 mg capsule Take 25 mg by mouth every six (6) hours as needed.  acetaminophen (TYLENOL) 325 mg tablet Take 650 mg by mouth every four (4) hours as needed.            Past Medical History:   Diagnosis Date    ADHD (attention deficit hyperactivity disorder)     Adverse effect of anesthesia 2015    dermoid cyst on right ovary (woke up had some hives after general anesthesia)    Anemia     Bicornate uterus     GERD (gastroesophageal reflux disease)     Kidney stones     Pelvic mass     Unspecified adverse effect of anesthesia     hives with general anethesia    Von Willebrand disease ([facility] [address]     she does not have per mother       Past Surgical History:   Procedure Laterality Date    HX COLONOSCOPY      HX LITHOTRIPSY  2015    HX OTHER SURGICAL      BIRTH SHIRA REMOVED FROM BUTTOCKS @ 6WEEKS OLD SX.    HX OTHER SURGICAL  2015    dermoid cyst removed       Social History     Socioeconomic History    Marital status: SINGLE     Spouse name: Not on file    Number of children: Not on file    Years of education: Not on file    Highest education level: Not on file   Occupational History    Not on file   Social Needs    Financial resource strain: Not on file    Food insecurity:     Worry: Not on file     Inability: Not on file    Transportation needs:     Medical: Not on file     Non-medical: Not on file   Tobacco Use    Smoking status: Former Smoker     Packs/day: 0.50     Last attempt to quit: 2019     Years since quittin.0    Smokeless tobacco: Current User    Tobacco comment: vape   Substance and Sexual Activity    Alcohol use:  Yes     Alcohol/week: 2.0 standard drinks     Types: 2 Glasses of wine per week    Drug use: Yes     Types: Marijuana    Sexual activity: Yes     Partners: Male     Birth control/protection: Condom   Lifestyle    Physical activity:     Days per week: Not on file     Minutes per session: Not on file    Stress: Not on file   Relationships    Social connections:     Talks on phone: Not on file     Gets together: Not on file     Attends [employer] service: Not on file     Active member of club or organization: Not on file     Attends meetings of clubs or organizations: Not on file     Relationship status: Not on file    Intimate partner violence:     Fear of current or ex partner: Not on file     Emotionally abused: Not on file     Physically abused: Not on file     Forced sexual activity: Not on file   Other Topics Concern    Not on file   Social History Narrative    Not on file       Patient does not have an advanced directive on file    Vitals:    10/08/19 1132 10/08/19 1156   BP: 131/86 120/80   Pulse: 73    Resp: 12    Temp: 98 °F (36.7 °C) TempSrc: Oral    SpO2: 99%    Weight: 149 lb (67.6 kg)    Height: 5' 6\" (1.676 m)    PainSc:   0 - No pain        Physical Exam   Constitutional: She is oriented to person, place, and time. No distress. Cardiovascular: Normal rate, regular rhythm and normal heart sounds. Pulmonary/Chest: Effort normal and breath sounds normal.   Neurological: She is alert and oriented to person, place, and time. Nursing note and vitals reviewed. Office Visit on 07/09/2019   Component Date Value Ref Range Status    Glucose 07/09/2019 75  70 - 99 mg/dL Final    BUN 07/09/2019 10  6 - 22 mg/dL Final    Creatinine 07/09/2019 0.6  0.5 - 1.2 mg/dL Final    Sodium 07/09/2019 140  133 - 145 mmol/L Final    Potassium 07/09/2019 4.4  3.5 - 5.5 mmol/L Final    Chloride 07/09/2019 102  98 - 110 mmol/L Final    CO2 07/09/2019 26  20 - 32 mmol/L Final    AST (SGOT) 07/09/2019 21  10 - 37 U/L Final    ALT (SGPT) 07/09/2019 21  5 - 40 U/L Final    Alk. phosphatase 07/09/2019 73  25 - 115 U/L Final    Bilirubin, total 07/09/2019 0.5  0.2 - 1.2 mg/dL Final    Calcium 07/09/2019 9.6  8.4 - 10.5 mg/dL Final    Protein, total 07/09/2019 7.5  6.4 - 8.3 g/dL Final    Albumin 07/09/2019 4.4  3.5 - 5.0 g/dL Final    A-G Ratio 07/09/2019 1.4  1.1 - 2.6 ratio Final    Globulin 07/09/2019 3.1  2.0 - 4.0 g/dL Final    Anion gap 07/09/2019 12.0  mmol/L Final    Comment: Test includes Albumin, Alkaline Phosphatase, ALT, AST, BUN, Calcium, CO2,  Chloride, Creatinine, Glucose, Potassium, Sodium, Total Bilirubin and Total  Protein. Estimated GFR results are reported in mL/min/1.73 sq.m. by the MDRD equation. This eGFR is validated for stable chronic renal failure patients. This   equation  is unreliable in acute illness or patients with normal renal function.  GFRAA 07/09/2019 >60.0  >60.0 Final    GFRNA 07/09/2019 >60.0  >60.0 Final       .No results found for any visits on 10/08/19.     Assessment / Plan:      ICD-10-CM ICD-9-CM    1. Palpitations R00.2 785.1    2. Anxiety F41.9 300.00        Patient will follow-up with gyn within the next 3 months  Propanolol 60 mg XR  Increased by Cardiology  Patient wearing holter monitor today      Follow-up and Dispositions    · Return in about 4 weeks (around 11/5/2019). I asked the patient if she  had any questions and answered her  questions. The patient stated that she understands the treatment plan and agrees with the treatment plan    This document was created with a voice activated dictation system and may contain transcription errors.

## 2019-10-29 ENCOUNTER — OFFICE VISIT (OUTPATIENT)
Dept: CARDIOLOGY CLINIC | Age: 19
End: 2019-10-29

## 2019-10-29 VITALS
DIASTOLIC BLOOD PRESSURE: 79 MMHG | WEIGHT: 150 LBS | HEIGHT: 66 IN | SYSTOLIC BLOOD PRESSURE: 117 MMHG | HEART RATE: 73 BPM | BODY MASS INDEX: 24.11 KG/M2

## 2019-10-29 DIAGNOSIS — R00.2 PALPITATION: ICD-10-CM

## 2019-10-29 DIAGNOSIS — I49.3 PVC (PREMATURE VENTRICULAR CONTRACTION): Primary | ICD-10-CM

## 2019-10-29 RX ORDER — PROPRANOLOL HYDROCHLORIDE 80 MG/1
80 CAPSULE, EXTENDED RELEASE ORAL DAILY
Qty: 90 CAP | Refills: 3 | Status: SHIPPED | OUTPATIENT
Start: 2019-10-29 | End: 2020-01-21

## 2019-10-29 NOTE — PROGRESS NOTES
1. Have you been to the ER, urgent care clinic since your last visit? Hospitalized since your last visit?     no    2. Have you seen or consulted any other health care providers outside of the [address] [place] [address] since your last visit? Include any pap smears or colon screening. No     3. Since your last visit, have you had any of the following symptoms?      palpitations.

## 2019-10-29 NOTE — PROGRESS NOTES
HISTORY OF PRESENT ILLNESS  Cali Dillard is a 22 y.o. female. Ms. Immanuel Hui presents with c/o palpitations. She reports palpitations and fast heart rate have been intermittent over the past month. She feels the sensation primarily when laying down, not with activity. 10/2019  Patient is here for follow up of diagnostic tests. Results will be discussed. Palpitations    The history is provided by the patient and medical records. This is a recurrent problem. The current episode started more than 1 week ago. The problem has been gradually worsening. The problem occurs daily. The problem is associated with anxiety, nicotine and caffeine. Pertinent negatives include no malaise/fatigue, no claudication, no orthopnea, no PND, no nausea, no vomiting, no dizziness and no cough.      Family History   Problem Relation Age of Onset    Heart Disease Maternal Grandfather     Heart Disease Paternal Grandmother     Diabetes Paternal Grandfather     No Known Problems Mother     No Known Problems Father     Heart Attack Paternal Uncle        Past Medical History:   Diagnosis Date    ADHD (attention deficit hyperactivity disorder)     Adverse effect of anesthesia     dermoid cyst on right ovary (woke up had some hives after general anesthesia)    Anemia     Bicornate uterus     GERD (gastroesophageal reflux disease)     Kidney stones     Pelvic mass     Unspecified adverse effect of anesthesia     hives with general anethesia    Von Willebrand disease ([address]     she does not have per mother       Past Surgical History:   Procedure Laterality Date    HX COLONOSCOPY      HX LITHOTRIPSY      HX OTHER SURGICAL      BIRTH SHIRA REMOVED FROM BUTTOCKS @ 6WEEKS OLD SX.    HX OTHER SURGICAL      dermoid cyst removed       Social History     Tobacco Use    Smoking status: Former Smoker     Packs/day: 0.50     Last attempt to quit: 2019     Years since quittin.1    Smokeless tobacco: Current User   [facility] Tobacco comment: vape   Substance Use Topics    Alcohol use: Yes     Alcohol/week: 2.0 standard drinks     Types: 2 Glasses of wine per week       Allergies   Allergen Reactions    Amoxicillin Hives    Morphine Other (comments)     Denies allergy      Nsaids (Non-Steroidal Anti-Inflammatory Drug) Other (comments)       Prior to Admission medications    Medication Sig Start Date End Date Taking? Authorizing Provider   propranolol LA (INDERAL LA) 80 mg SR capsule Take 1 Cap by mouth daily. 10/29/19  Yes Ryan Vaughan MD   sertraline (ZOLOFT) 50 mg tablet Take 1 1/2 tablets by mouth daily  Patient taking differently: 75 mg. Take 1 1/2 tablets by mouth daily 10/8/19  Yes Lorne Cronin NP   LOW-OGESTREL, 28, 0.3-30 mg-mcg tab TAKE 1 TABLET BY MOUTH DAILY 6/21/19  Yes Sai Martinez MD   diphenhydrAMINE (BENADRYL) 25 mg capsule Take 25 mg by mouth every six (6) hours as needed. Yes Provider, Historical   multivitamin with iron tablet Take 1 Tab by mouth daily. Yes Provider, Historical   acetaminophen (TYLENOL) 325 mg tablet Take 650 mg by mouth every four (4) hours as needed. Yes Arcenio Gregory MD         Visit Vitals  /79   Pulse 73   Ht 5' 6\" (1.676 m)   Wt 68 kg (150 lb)   BMI 24.21 kg/m²       Review of Systems   Constitutional: Negative for malaise/fatigue. Respiratory: Negative for cough and wheezing. Cardiovascular: Negative for palpitations, orthopnea, claudication, leg swelling and PND. Gastrointestinal: Negative for nausea and vomiting. Musculoskeletal: Negative for falls. Neurological: Negative for dizziness. Endo/Heme/Allergies: Does not bruise/bleed easily. Physical Exam   Constitutional: She is oriented to person, place, and time. She appears well-developed and well-nourished. Neck: No JVD present. Cardiovascular: Normal rate, regular rhythm, normal heart sounds and intact distal pulses. Exam reveals no gallop and no friction rub.    No murmur heard.  Pulmonary/Chest: Effort normal and breath sounds normal. No respiratory distress. She has no wheezes. She has no rales. She exhibits no tenderness. Abdominal: Soft. She exhibits no distension and no mass. There is no tenderness. Musculoskeletal: Normal range of motion. She exhibits no edema. Neurological: She is alert and oriented to person, place, and time. Skin: Skin is warm and dry. Psychiatric: She has a normal mood and affect. Nursing note and vitals reviewed. I have personally reviewed patients ekg done at other facility. I Have personally reviewed recent relevant labs available and discussed with patient    ASSESSMENT and PLAN     Ms. Gigi Rashid has a reminder for a \"due or due soon\" health maintenance. I have asked that she contact her primary care provider for follow-up on this health maintenance. No flowsheet data found. Assessment         ICD-10-CM ICD-9-CM    1. PVC (premature ventricular contraction) I49.3 427.69 ECHO ADULT COMPLETE    Occasional PVCs noted with palpitation. Currently on beta-blocker check echo for LV function and structural heart disease   2. Palpitation R00.2 785.1 propranolol LA (INDERAL LA) 80 mg SR capsule      ECHO ADULT COMPLETE    Palpitations are improving not completely resolved will increase propranolol to 80 mg long-acting once a day     9/2019 Ms. Gigi Rashid c/o palpitations with fast heart rate and occasional chest tightness. Symptoms have been intermittent over the past month. Recent labs reviewed, TSH normal, EKG SR. Will obtain event monitor to r/o arrhythmias. Encouraged smoking cessation, decrease caffeine intake and limit alcohol to no more than 1 drink / day. Palpitations are likely due to situational anxiety. Will d/c propranolol and begin long acting Propranolol 60 mg/ day. Discussed relaxation techniques such as yoga or exercise. 10/2019  Palpitation not improving on propranolol dose adjusted with increased dose. PVCs on monitoring. Check echo for structural heart disease  Medications Discontinued During This Encounter   Medication Reason    propranolol LA (INDERAL LA) 60 mg SR capsule        Orders Placed This Encounter    propranolol LA (INDERAL LA) 80 mg SR capsule     Sig: Take 1 Cap by mouth daily. Dispense:  90 Cap     Refill:  3       Follow-up and Dispositions    · Return in about 3 months (around 1/29/2020).          Clemencia Guadalupe MD

## 2019-11-04 DIAGNOSIS — R00.2 PALPITATION: ICD-10-CM

## 2020-01-21 ENCOUNTER — OFFICE VISIT (OUTPATIENT)
Dept: CARDIOLOGY CLINIC | Age: 20
End: 2020-01-21

## 2020-01-21 VITALS
HEART RATE: 68 BPM | DIASTOLIC BLOOD PRESSURE: 81 MMHG | HEIGHT: 66 IN | WEIGHT: 150 LBS | BODY MASS INDEX: 24.11 KG/M2 | SYSTOLIC BLOOD PRESSURE: 126 MMHG

## 2020-01-21 DIAGNOSIS — R00.2 PALPITATION: ICD-10-CM

## 2020-01-21 DIAGNOSIS — R07.89 CHEST TIGHTNESS: ICD-10-CM

## 2020-01-21 DIAGNOSIS — I49.3 PVC (PREMATURE VENTRICULAR CONTRACTION): Primary | ICD-10-CM

## 2020-01-21 RX ORDER — PROPRANOLOL HYDROCHLORIDE 60 MG/1
60 CAPSULE, EXTENDED RELEASE ORAL DAILY
Qty: 90 CAP | Refills: 3 | Status: SHIPPED | OUTPATIENT
Start: 2020-01-21 | End: 2020-05-05

## 2020-01-21 NOTE — PROGRESS NOTES
1. Have you been to the ER, urgent care clinic since your last visit? Hospitalized since your last visit?     no    2. Have you seen or consulted any other health care providers outside of the [address] [place] [address] since your last visit? Include any pap smears or colon screening. No     3. Since your last visit, have you had any of the following symptoms?  no

## 2020-01-21 NOTE — PROGRESS NOTES
HISTORY OF PRESENT ILLNESS  Chula Later is a 22 y.o. female. Ms. Camille Choi presents with c/o palpitations. She reports palpitations and fast heart rate have been intermittent over the past month. She feels the sensation primarily when laying down, not with activity. 10/2019  Patient is here for follow up of diagnostic tests. Results will be discussed. 1/2020  Patient is here for follow-up. On follow up patient denies any chest pains,sob, or other significant symptoms. No palpitations are improving on beta-blocker      Palpitations    The history is provided by the patient and medical records. This is a recurrent problem. The current episode started more than 1 week ago. The problem has been gradually worsening. The problem occurs daily. The problem is associated with anxiety, nicotine and caffeine. Pertinent negatives include no malaise/fatigue, no claudication, no orthopnea, no PND, no nausea, no vomiting, no dizziness and no cough.      Family History   Problem Relation Age of Onset    Heart Disease Maternal Grandfather     Heart Disease Paternal Grandmother     Diabetes Paternal Grandfather     No Known Problems Mother     No Known Problems Father     Heart Attack Paternal Uncle        Past Medical History:   Diagnosis Date    ADHD (attention deficit hyperactivity disorder)     Adverse effect of anesthesia 2015    dermoid cyst on right ovary (woke up had some hives after general anesthesia)    Anemia     Bicornate uterus     GERD (gastroesophageal reflux disease)     Kidney stones     Pelvic mass     Unspecified adverse effect of anesthesia     hives with general anethesia    Von Willebrand disease ([facility] [address]     she does not have per mother       Past Surgical History:   Procedure Laterality Date    HX COLONOSCOPY      HX LITHOTRIPSY  2015    HX OTHER SURGICAL      BIRTH SHIRA REMOVED FROM BUTTOCKS @ 6WEEKS OLD SX.    HX OTHER SURGICAL  2015    dermoid cyst removed       Social History Tobacco Use    Smoking status: Former Smoker     Packs/day: 0.50     Last attempt to quit: 2019     Years since quittin.3    Smokeless tobacco: Current User    Tobacco comment: vape   Substance Use Topics    Alcohol use: Yes     Alcohol/week: 2.0 standard drinks     Types: 2 Glasses of wine per week       Allergies   Allergen Reactions    Amoxicillin Hives    Morphine Other (comments)     Denies allergy      Nsaids (Non-Steroidal Anti-Inflammatory Drug) Other (comments)       Prior to Admission medications    Medication Sig Start Date End Date Taking? Authorizing Provider   propranolol LA (INDERAL LA) 60 mg SR capsule Take 1 Cap by mouth daily. 20  Yes Adaline MD Cain   sertraline (ZOLOFT) 50 mg tablet Take 1 1/2 tablets by mouth daily  Patient taking differently: 75 mg. Take 1 1/2 tablets by mouth daily 10/8/19  Yes Jing Cronin NP LOW-OGESTREL, 28, 0.3-30 mg-mcg tab TAKE 1 TABLET BY MOUTH DAILY 19  Yes Shabnam Casas MD   diphenhydrAMINE (BENADRYL) 25 mg capsule Take 25 mg by mouth every six (6) hours as needed. Yes Provider, Historical   multivitamin with iron tablet Take 1 Tab by mouth daily. Yes Provider, Historical   acetaminophen (TYLENOL) 325 mg tablet Take 650 mg by mouth every four (4) hours as needed. Yes Arcenio Gregory MD         Visit Vitals  /81   Pulse 68   Ht 5' 6\" (1.676 m)   Wt 68 kg (150 lb)   BMI 24.21 kg/m²       Review of Systems   Constitutional: Negative for malaise/fatigue. Respiratory: Negative for cough and wheezing. Cardiovascular: Negative for palpitations, orthopnea, claudication, leg swelling and PND. Gastrointestinal: Negative for nausea and vomiting. Musculoskeletal: Negative for falls. Neurological: Negative for dizziness. Endo/Heme/Allergies: Does not bruise/bleed easily. Physical Exam   Constitutional: She is oriented to person, place, and time. She appears well-developed and well-nourished.    Neck: No JVD present. Cardiovascular: Normal rate, regular rhythm, normal heart sounds and intact distal pulses. Exam reveals no gallop and no friction rub. No murmur heard. Pulmonary/Chest: Effort normal and breath sounds normal. No respiratory distress. She has no wheezes. She has no rales. She exhibits no tenderness. Abdominal: Soft. She exhibits no distension and no mass. There is no tenderness. Musculoskeletal: Normal range of motion. General: No edema. Neurological: She is alert and oriented to person, place, and time. Skin: Skin is warm and dry. Psychiatric: She has a normal mood and affect. Nursing note and vitals reviewed. I have personally reviewed patients ekg done at other facility. I Have personally reviewed recent relevant labs available and discussed with patient    ASSESSMENT and PLAN     Ms. Pepe Jasso has a reminder for a \"due or due soon\" health maintenance. I have asked that she contact her primary care provider for follow-up on this health maintenance. No flowsheet data found. Interpretation Summary 12/2019    · Normal cavity size, wall thickness, systolic function (ejection fraction normal) and diastolic function. Estimated left ventricular ejection fraction is 55 - 60%. No regional wall motion abnormality noted. · Normal right ventricular size and function. · No hemodynamically significant valvular pathology. Assessment         ICD-10-CM ICD-9-CM    1. PVC (premature ventricular contraction) I49.3 427.69     Stable. Normal LV function no significant abnormality on echo. Continue monitoring   2. Chest tightness R07.89 786.59     Improved. Continue to monitor clinically   3. Palpitation R00.2 785.1 propranolol LA (INDERAL LA) 60 mg SR capsule    Stable monitor   4.  Palpitation R00.2 785.1 propranolol LA (INDERAL LA) 60 mg SR capsule    Palpitations are improving not completely resolved will increase propranolol to 80 mg long-acting once a day     9/2019 Ms. Zahraa Neighbours c/o palpitations with fast heart rate and occasional chest tightness. Symptoms have been intermittent over the past month. Recent labs reviewed, TSH normal, EKG SR. Will obtain event monitor to r/o arrhythmias. Encouraged smoking cessation, decrease caffeine intake and limit alcohol to no more than 1 drink / day. Palpitations are likely due to situational anxiety. Will d/c propranolol and begin long acting Propranolol 60 mg/ day. Discussed relaxation techniques such as yoga or exercise. 10/2019  Palpitation not improving on propranolol dose adjusted with increased dose. PVCs on monitoring. Check echo for structural heart disease  1/2020  Cardiac status stable. Now on propranolol 60 mg a day and her stress level has gone down and palpitations are better. No chest tightness. Echo normal.  Medications Discontinued During This Encounter   Medication Reason    propranolol LA (INDERAL LA) 80 mg SR capsule        Orders Placed This Encounter    propranolol LA (INDERAL LA) 60 mg SR capsule     Sig: Take 1 Cap by mouth daily. Dispense:  90 Cap     Refill:  3       Follow-up and Dispositions    · Return in about 1 year (around 1/21/2021).          Marquis Carvalho MD

## 2020-03-17 ENCOUNTER — OFFICE VISIT (OUTPATIENT)
Dept: FAMILY MEDICINE CLINIC | Facility: CLINIC | Age: 20
End: 2020-03-17

## 2020-03-17 VITALS
TEMPERATURE: 98.7 F | HEART RATE: 79 BPM | RESPIRATION RATE: 12 BRPM | SYSTOLIC BLOOD PRESSURE: 126 MMHG | DIASTOLIC BLOOD PRESSURE: 86 MMHG | WEIGHT: 160 LBS | HEIGHT: 66 IN | BODY MASS INDEX: 25.71 KG/M2 | OXYGEN SATURATION: 97 %

## 2020-03-17 DIAGNOSIS — N30.00 ACUTE CYSTITIS WITHOUT HEMATURIA: ICD-10-CM

## 2020-03-17 DIAGNOSIS — R35.0 FREQUENCY OF URINATION: Primary | ICD-10-CM

## 2020-03-17 LAB
BILIRUB UR QL STRIP: NEGATIVE
GLUCOSE UR-MCNC: NEGATIVE MG/DL
KETONES P FAST UR STRIP-MCNC: NEGATIVE MG/DL
PH UR STRIP: 7 [PH] (ref 4.6–8)
PROT UR QL STRIP: NEGATIVE
SP GR UR STRIP: 1.02 (ref 1–1.03)
UA UROBILINOGEN AMB POC: NORMAL (ref 0.2–1)
URINALYSIS CLARITY POC: NORMAL
URINALYSIS COLOR POC: NORMAL
URINE BLOOD POC: NEGATIVE
URINE LEUKOCYTES POC: NORMAL
URINE NITRITES POC: NEGATIVE

## 2020-03-17 RX ORDER — SULFAMETHOXAZOLE AND TRIMETHOPRIM 800; 160 MG/1; MG/1
1 TABLET ORAL 2 TIMES DAILY
Qty: 14 TAB | Refills: 0 | Status: SHIPPED | OUTPATIENT
Start: 2020-03-17 | End: 2020-03-24

## 2020-03-17 NOTE — PROGRESS NOTES
Visit Vitals  /86   Pulse 79   Temp 98.7 °F (37.1 °C) (Oral)   Resp 12   Ht 5' 6\" (1.676 m)   Wt 160 lb (72.6 kg)   SpO2 97%   BMI 25.82 kg/m²     Nanette Mines presents today for   Chief Complaint   Patient presents with    Results    Urinary Frequency     possible UTI       Is someone accompanying this pt? no    Is the patient using any DME equipment during OV? no    Depression Screening:  3 most recent PHQ Screens 3/17/2020   Little interest or pleasure in doing things Not at all   Feeling down, depressed, irritable, or hopeless Not at all   Total Score PHQ 2 0   Trouble falling or staying asleep, or sleeping too much -   Feeling tired or having little energy -   Poor appetite, weight loss, or overeating -   Feeling bad about yourself - or that you are a failure or have let yourself or your family down -   Trouble concentrating on things such as school, work, reading, or watching TV -   Moving or speaking so slowly that other people could have noticed; or the opposite being so fidgety that others notice -   Thoughts of being better off dead, or hurting yourself in some way -   PHQ 9 Score -   How difficult have these problems made it for you to do your work, take care of your home and get along with others -       Learning Assessment:  Learning Assessment 6/6/2019   PRIMARY LEARNER Patient   HIGHEST LEVEL OF EDUCATION - PRIMARY LEARNER  SOME COLLEGE   BARRIERS PRIMARY LEARNER NONE   PRIMARY LANGUAGE ENGLISH   LEARNER PREFERENCE PRIMARY DEMONSTRATION   ANSWERED BY self   RELATIONSHIP SELF       Abuse Screening:  No flowsheet data found. Fall Risk  No flowsheet data found. Health Maintenance reviewed and discussed and ordered per Provider. Health Maintenance Due   Topic Date Due    HPV Age 9Y-34Y (1 - 2-dose series) 05/27/2009    DTaP/Tdap/Td series (1 - Tdap) 05/27/2019    PAP AKA CERVICAL CYTOLOGY  05/27/2019           Coordination of Care:  1.  Have you been to the ER, urgent care clinic since your last visit? Hospitalized since your last visit? no    2. Have you seen or consulted any other health care providers outside of the [address] [place] [address] since your last visit? Include any pap smears or colon screening.  no

## 2020-03-17 NOTE — PROGRESS NOTES
Classie Romberg is a 22 y.o. female presenting today for Results and Urinary Frequency (possible UTI)  . HPI:  Classie Romberg presents to the office today for dysuria, frequency and urgency x 48 hours. She is afebrile and denies any abdominal pain or tenderness. Review of Systems   Constitutional: Negative for malaise/fatigue. Respiratory: Negative for cough and sputum production. Cardiovascular: Negative for chest pain and palpitations. Gastrointestinal: Negative for abdominal pain, nausea and vomiting. Genitourinary: Positive for dysuria, frequency and urgency. Allergies   Allergen Reactions    Amoxicillin Hives    Morphine Other (comments)     Denies allergy      Nsaids (Non-Steroidal Anti-Inflammatory Drug) Other (comments)       Current Outpatient Medications   Medication Sig Dispense Refill    trimethoprim-sulfamethoxazole (BACTRIM DS, SEPTRA DS) 160-800 mg per tablet Take 1 Tab by mouth two (2) times a day for 7 days. 14 Tab 0    propranolol LA (INDERAL LA) 60 mg SR capsule Take 1 Cap by mouth daily. 90 Cap 3    sertraline (ZOLOFT) 50 mg tablet Take 1 1/2 tablets by mouth daily (Patient taking differently: 75 mg. Take 1 1/2 tablets by mouth daily) 60 Tab 2    LOW-OGESTREL, 28, 0.3-30 mg-mcg tab TAKE 1 TABLET BY MOUTH DAILY 84 Tab 6    diphenhydrAMINE (BENADRYL) 25 mg capsule Take 25 mg by mouth every six (6) hours as needed.  multivitamin with iron tablet Take 1 Tab by mouth daily.  acetaminophen (TYLENOL) 325 mg tablet Take 650 mg by mouth every four (4) hours as needed.            Past Medical History:   Diagnosis Date    ADHD (attention deficit hyperactivity disorder)     Adverse effect of anesthesia 2015    dermoid cyst on right ovary (woke up had some hives after general anesthesia)    Anemia     Bicornate uterus     GERD (gastroesophageal reflux disease)     Kidney stones     Pelvic mass     Unspecified adverse effect of anesthesia     hives with general anethesia  Von Willebrand disease ([facility]     she does not have per mother       Past Surgical History:   Procedure Laterality Date    HX COLONOSCOPY      HX LITHOTRIPSY  2015    HX OTHER SURGICAL      BIRTH SHIRA REMOVED FROM BUTTOCKS @ 6WEEKS OLD SX.    HX OTHER SURGICAL  2015    dermoid cyst removed       Social History     Socioeconomic History    Marital status: SINGLE     Spouse name: Not on file    Number of children: Not on file    Years of education: Not on file    Highest education level: Not on file   Occupational History    Not on file   Social Needs    Financial resource strain: Not on file    Food insecurity     Worry: Not on file     Inability: Not on file    Transportation needs     Medical: Not on file     Non-medical: Not on file   Tobacco Use    Smoking status: Former Smoker     Packs/day: 0.50     Last attempt to quit: 2019     Years since quittin.4    Smokeless tobacco: Current User    Tobacco comment: vape   Substance and Sexual Activity    Alcohol use:  Yes     Alcohol/week: 2.0 standard drinks     Types: 2 Glasses of wine per week    Drug use: Yes     Types: Marijuana    Sexual activity: Yes     Partners: Male     Birth control/protection: Condom   Lifestyle    Physical activity     Days per week: Not on file     Minutes per session: Not on file    Stress: Not on file   Relationships    Social connections     Talks on phone: Not on file     Gets together: Not on file     Attends [employer] service: Not on file     Active member of club or organization: Not on file     Attends meetings of clubs or organizations: Not on file     Relationship status: Not on file    Intimate partner violence     Fear of current or ex partner: Not on file     Emotionally abused: Not on file     Physically abused: Not on file     Forced sexual activity: Not on file   Other Topics Concern    Not on file   Social History Narrative    Not on file       Patient does not have an advanced directive on file    Vitals:    03/17/20 0903   BP: 126/86   Pulse: 79   Resp: 12   Temp: 98.7 °F (37.1 °C)   TempSrc: Oral   SpO2: 97%   Weight: 160 lb (72.6 kg)   Height: 5' 6\" (1.676 m)       Physical Exam  Vitals signs and nursing note reviewed. Constitutional:       Appearance: Normal appearance. Cardiovascular:      Rate and Rhythm: Normal rate and regular rhythm. Pulses: Normal pulses. Heart sounds: Normal heart sounds. Pulmonary:      Effort: Pulmonary effort is normal.      Breath sounds: Normal breath sounds. Abdominal:      General: Bowel sounds are normal.   Skin:     General: Skin is warm and dry. Neurological:      Mental Status: She is alert.          Office Visit on 03/17/2020   Component Date Value Ref Range Status    Color (UA POC) 03/17/2020 Dark Yellow   Final    Clarity (UA POC) 03/17/2020 Slightly Cloudy   Final    Glucose (UA POC) 03/17/2020 Negative  Negative Final    Bilirubin (UA POC) 03/17/2020 Negative  Negative Final    Ketones (UA POC) 03/17/2020 Negative  Negative Final    Specific gravity (UA POC) 03/17/2020 1.025  1.001 - 1.035 Final    Blood (UA POC) 03/17/2020 Negative  Negative Final    pH (UA POC) 03/17/2020 7.0  4.6 - 8.0 Final    Protein (UA POC) 03/17/2020 Negative  Negative Final    Urobilinogen (UA POC) 03/17/2020 0.2 mg/dL  0.2 - 1 Final    Nitrites (UA POC) 03/17/2020 Negative  Negative Final    Leukocyte esterase (UA POC) 03/17/2020 1+  Negative Final       .  Results for orders placed or performed in visit on 03/17/20   AMB POC URINALYSIS DIP STICK AUTO W/O MICRO   Result Value Ref Range    Color (UA POC) Dark Yellow     Clarity (UA POC) Slightly Cloudy     Glucose (UA POC) Negative Negative    Bilirubin (UA POC) Negative Negative    Ketones (UA POC) Negative Negative    Specific gravity (UA POC) 1.025 1.001 - 1.035    Blood (UA POC) Negative Negative    pH (UA POC) 7.0 4.6 - 8.0    Protein (UA POC) Negative Negative    Urobilinogen (UA POC) 0.2 mg/dL 0.2 - 1    Nitrites (UA POC) Negative Negative    Leukocyte esterase (UA POC) 1+ Negative       Assessment / Plan:      ICD-10-CM ICD-9-CM    1. Frequency of urination R35.0 788.41 AMB POC URINALYSIS DIP STICK AUTO W/O MICRO   2. Acute cystitis without hematuria N30.00 595.0 CULTURE, URINE      trimethoprim-sulfamethoxazole (BACTRIM DS, SEPTRA DS) 160-800 mg per tablet     Acute cystitis- Bactrim rx given  Urine culture given  F/u prn        I asked the patient if she  had any questions and answered her  questions. The patient stated that she understands the treatment plan and agrees with the treatment plan    This document was created with a voice activated dictation system and may contain transcription errors.

## 2020-05-05 ENCOUNTER — VIRTUAL VISIT (OUTPATIENT)
Dept: FAMILY MEDICINE CLINIC | Facility: CLINIC | Age: 20
End: 2020-05-05

## 2020-05-05 DIAGNOSIS — F41.9 ANXIETY: Primary | ICD-10-CM

## 2020-05-05 RX ORDER — LORAZEPAM 0.5 MG/1
0.5 TABLET ORAL
Qty: 60 TAB | Refills: 1 | Status: SHIPPED | OUTPATIENT
Start: 2020-05-05 | End: 2021-04-08 | Stop reason: SDUPTHER

## 2020-05-05 RX ORDER — SERTRALINE HYDROCHLORIDE 100 MG/1
100 TABLET, FILM COATED ORAL DAILY
Qty: 90 TAB | Refills: 0 | Status: SHIPPED | OUTPATIENT
Start: 2020-05-05 | End: 2020-08-23

## 2020-05-05 NOTE — PROGRESS NOTES
Umberto Jones is a 22 y.o. female who was seen by synchronous (real-time) audio-video technology on 5/5/2020. Consent: Umberto Jones, who was seen by synchronous (real-time) audio-video technology, and/or her healthcare decision maker, is aware that this patient-initiated, Telehealth encounter on 5/5/2020 is a billable service, with coverage as determined by her insurance carrier. She is aware that she may receive a bill and has provided verbal consent to proceed: Yes. Assessment & Plan:   Diagnoses and all orders for this visit:    1. Anxiety  -     LORazepam (ATIVAN) 0.5 mg tablet; Take 1 Tab by mouth every eight (8) hours as needed for Anxiety. Max Daily Amount: 1.5 mg.  -     sertraline (ZOLOFT) 100 mg tablet; Take 1 Tab by mouth daily. Subjective:   Umberto Jnoes is a 22 y.o. female who was seen for Anxiety    The patient presents for an Audio-visual teleconference appointment for anxiety follow-up care. Notes she has felt very anxious over the last several weeks. She is complaining of have multiple panic attacks. She is currently prescribed Zoloft 100 mg daily. She is requesting a prescription for breakthrough panic attacks. Prior to Admission medications    Medication Sig Start Date End Date Taking? Authorizing Provider   LORazepam (ATIVAN) 0.5 mg tablet Take 1 Tab by mouth every eight (8) hours as needed for Anxiety. Max Daily Amount: 1.5 mg. 5/5/20  Yes Renard Riddle NP   sertraline (ZOLOFT) 100 mg tablet Take 1 Tab by mouth daily. 5/5/20  Yes Renard Riddle NP   LOW-OGESTREL, 28, 0.3-30 mg-mcg tab TAKE 1 TABLET BY MOUTH DAILY 6/21/19  Yes Roman Benítez MD   multivitamin with iron tablet Take 1 Tab by mouth daily. Yes Provider, Violet   acetaminophen (TYLENOL) 325 mg tablet Take 650 mg by mouth every four (4) hours as needed. Yes Colt, MD Arcenio   diphenhydrAMINE (BENADRYL) 25 mg capsule Take 25 mg by mouth every six (6) hours as needed.     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 Max Daily Amount: 1.5 mg. 60 Tab 1    sertraline (ZOLOFT) 100 mg tablet Take 1 Tab by mouth daily. 90 Tab 0    LOW-OGESTREL, 28, 0.3-30 mg-mcg tab TAKE 1 TABLET BY MOUTH DAILY 84 Tab 6    multivitamin with iron tablet Take 1 Tab by mouth daily.  acetaminophen (TYLENOL) 325 mg tablet Take 650 mg by mouth every four (4) hours as needed.  diphenhydrAMINE (BENADRYL) 25 mg capsule Take 25 mg by mouth every six (6) hours as needed. Allergies   Allergen Reactions    Amoxicillin Hives    Morphine Other (comments)     Denies allergy      Nsaids (Non-Steroidal Anti-Inflammatory Drug) Other (comments)     Past Medical History:   Diagnosis Date    ADHD (attention deficit hyperactivity disorder)     Adverse effect of anesthesia 2015    dermoid cyst on right ovary (woke up had some hives after general anesthesia)    Anemia     Bicornate uterus     GERD (gastroesophageal reflux disease)     Kidney stones     Pelvic mass     Unspecified adverse effect of anesthesia     hives with general anethesia    Von Willebrand disease ([facility]     she does not have per mother       ROS     . Constitutional: No apparent distress noted  General- negative for fever, chills or fatigue  Eyes- negative visual changes  CV- denies chest pain, palpitation  Pul: negative cough or SOB  GI: negative nausea, flank pain, diarrhea, constipation  Urinary:- No dysuria or polyuria  MS- negative myalgia, negative joint pain  Neuro- negative headache, dizziness or weakness  Skin- negative for rashes or lesions. Psych-positive panic attacks, history for anxiety  Objective:   Vital Signs: (As obtained by patient/caregiver at home)  There were no vitals taken for this visit. [INSTRUCTIONS:  \"[x]\" Indicates a positive item  \"[]\" Indicates a negative item  -- DELETE ALL ITEMS NOT EXAMINED]    Constitutional: [x] Appears well-developed and well-nourished [x] No apparent distress      [] Abnormal -     Mental status: [x] Alert and awake  [x] Oriented to person/place/time [x] Able to follow commands    [] Abnormal -     Eyes:   EOM    [x]  Normal    [] Abnormal -   Sclera  [x]  Normal    [] Abnormal -          Discharge [x]  None visible   [] Abnormal -     HENT: [x] Normocephalic, atraumatic  [] Abnormal -   [x] Mouth/Throat: Mucous membranes are moist    External Ears [x] Normal  [] Abnormal -    Neck: [x] No visualized mass [] Abnormal -     Pulmonary/Chest: [x] Respiratory effort normal   [x] No visualized signs of difficulty breathing or respiratory distress        [] Abnormal -      Musculoskeletal:   [x] Normal gait with no signs of ataxia         [x] Normal range of motion of neck        [] Abnormal -     Neurological:        [x] No Facial Asymmetry (Cranial nerve 7 motor function) (limited exam due to video visit)          [x] No gaze palsy        [] Abnormal -          Skin:        [x] No significant exanthematous lesions or discoloration noted on facial skin         [] Abnormal -            Psychiatric:       [x] Normal Affect [] Abnormal -        [x] No Hallucinations    Other pertinent observable physical exam findings:-        We discussed the expected course, resolution and complications of the diagnosis(es) in detail.   Medication risks, benefits, costs, interactions, and alternatives were discussed as indicated. I advised her to contact the office if her condition worsens, changes or fails to improve as anticipated. She expressed understanding with the diagnosis(es) and plan. Beulah Parada is a 22 y.o. female who was evaluated by a video visit encounter for concerns as above. Patient identification was verified prior to start of the visit. A caregiver was present when appropriate. Due to this being a TeleHealth encounter (During [facility] public health emergency), evaluation of the following organ systems was limited: Vitals/Constitutional/EENT/Resp/CV/GI//MS/Neuro/Skin/Heme-Lymph-Imm. Pursuant to the emergency declaration under the [facility] waiver authority and the [employer] Act, this Virtual  Visit was conducted, with patient's (and/or legal guardian's) consent, to reduce the patient's risk of exposure to COVID-19 and provide necessary medical care. Services were provided through a video synchronous discussion virtually to substitute for in-person clinic visit. Patient and provider were located at their individual homes.       Pilar Crespo NP

## 2020-07-07 ENCOUNTER — E-VISIT (OUTPATIENT)
Dept: FAMILY MEDICINE CLINIC | Facility: CLINIC | Age: 20
End: 2020-07-07

## 2020-07-12 ENCOUNTER — NURSE TRIAGE (OUTPATIENT)
Dept: OTHER | Facility: CLINIC | Age: 20
End: 2020-07-12

## 2020-07-13 NOTE — TELEPHONE ENCOUNTER
Reason for Disposition   Pain or burning with passing urine (urination)    Answer Assessment - Initial Assessment Questions  1. LOCATION: \"Where does it hurt? \" (e.g., left, right)      Right side   2. ONSET: \"When did the pain start? \"      2 hours ago   3. SEVERITY: \"How bad is the pain? \" (e.g., Scale 1-10; mild, moderate, or severe)    - MILD (1-3): doesn't interfere with normal activities     - MODERATE (4-7): interferes with normal activities or awakens from sleep     - SEVERE (8-10): excruciating pain and patient unable to do normal activities (stays in bed)        Current pain level is 7/10 - pain was worse at first   4. PATTERN: \"Does the pain come and go, or is it constant? \"       Constant   5. CAUSE: \"What do you think is causing the pain? \"      Kidney infection  6. OTHER SYMPTOMS:  \"Do you have any other symptoms? \" (e.g., fever, abdominal pain, vomiting, leg weakness, burning with urination, blood in urine)      Burning with urination, abdominal pain, urine is dark and cloudy - did not notice any blood. Unsure if she has a fever. No vomiting. Nausea present. Protocols used: FLANK PAIN-ADULT-    Patient has taken an [address]. Patient is feels as though she has to go but only a little bit is coming out.

## 2020-08-04 DIAGNOSIS — Z30.41 ENCOUNTER FOR SURVEILLANCE OF CONTRACEPTIVE PILLS: ICD-10-CM

## 2020-08-04 NOTE — TELEPHONE ENCOUNTER
Requested Prescriptions     Pending Prescriptions Disp Refills    norgestrel-ethinyl estradioL (Low-Ogestrel, 28,) 0.3-30 mg-mcg tab 84 Tab 6

## 2020-08-13 RX ORDER — NORGESTREL AND ETHINYL ESTRADIOL 0.3-0.03MG
1 KIT ORAL DAILY
Qty: 84 TAB | Refills: 6 | Status: SHIPPED | OUTPATIENT
Start: 2020-08-13 | End: 2022-03-04

## 2020-08-23 DIAGNOSIS — F41.9 ANXIETY: ICD-10-CM

## 2020-08-23 RX ORDER — SERTRALINE HYDROCHLORIDE 100 MG/1
TABLET, FILM COATED ORAL
Qty: 90 TAB | Refills: 0 | Status: SHIPPED | OUTPATIENT
Start: 2020-08-23 | End: 2020-12-10

## 2020-12-08 DIAGNOSIS — F41.9 ANXIETY: ICD-10-CM

## 2020-12-10 RX ORDER — SERTRALINE HYDROCHLORIDE 100 MG/1
TABLET, FILM COATED ORAL
Qty: 90 TAB | Refills: 0 | Status: SHIPPED | OUTPATIENT
Start: 2020-12-10 | End: 2021-03-29

## 2021-03-30 ENCOUNTER — TELEPHONE (OUTPATIENT)
Dept: FAMILY MEDICINE CLINIC | Age: 21
End: 2021-03-30

## 2021-04-13 ENCOUNTER — TELEPHONE (OUTPATIENT)
Dept: FAMILY MEDICINE CLINIC | Age: 21
End: 2021-04-13

## 2021-06-18 ENCOUNTER — VIRTUAL VISIT (OUTPATIENT)
Dept: FAMILY MEDICINE CLINIC | Age: 21
End: 2021-06-18
Payer: COMMERCIAL

## 2021-06-18 DIAGNOSIS — F41.9 ANXIETY: ICD-10-CM

## 2021-06-18 PROCEDURE — 99213 OFFICE O/P EST LOW 20 MIN: CPT | Performed by: NURSE PRACTITIONER

## 2021-06-18 RX ORDER — SERTRALINE HYDROCHLORIDE 100 MG/1
TABLET, FILM COATED ORAL
Qty: 90 TABLET | Refills: 0 | Status: SHIPPED | OUTPATIENT
Start: 2021-06-18 | End: 2022-03-04

## 2021-06-18 NOTE — PROGRESS NOTES
Roly Tracey presents today for   Chief Complaint   Patient presents with    Medication Refill    Depression     follow-up       Virtual/telephone visit    Depression Screening:  3 most recent PHQ Screens 6/18/2021   Little interest or pleasure in doing things Not at all   Feeling down, depressed, irritable, or hopeless Not at all   Total Score PHQ 2 0   Trouble falling or staying asleep, or sleeping too much -   Feeling tired or having little energy -   Poor appetite, weight loss, or overeating -   Feeling bad about yourself - or that you are a failure or have let yourself or your family down -   Trouble concentrating on things such as school, work, reading, or watching TV -   Moving or speaking so slowly that other people could have noticed; or the opposite being so fidgety that others notice -   Thoughts of being better off dead, or hurting yourself in some way -   PHQ 9 Score -   How difficult have these problems made it for you to do your work, take care of your home and get along with others -       Learning Assessment:  Learning Assessment 6/6/2019   PRIMARY LEARNER Patient   HIGHEST LEVEL OF EDUCATION - PRIMARY LEARNER  SOME COLLEGE   BARRIERS PRIMARY LEARNER NONE   PRIMARY LANGUAGE ENGLISH   LEARNER PREFERENCE PRIMARY DEMONSTRATION   ANSWERED BY self   RELATIONSHIP SELF       Health Maintenance reviewed and discussed and ordered per Provider. Health Maintenance Due   Topic Date Due    Hepatitis C Screening  Never done    HPV Age 9Y-34Y (1 - 2-dose series) Never done    DTaP/Tdap/Td series (1 - Tdap) Never done    PAP AKA CERVICAL CYTOLOGY  Never done   . Coordination of Care:  1. Have you been to the ER, urgent care clinic since your last visit? Hospitalized since your last visit? no    2. Have you seen or consulted any other health care providers outside of the [address] [place] [address] since your last visit? Include any pap smears or colon screening.  no

## 2021-06-18 NOTE — PROGRESS NOTES
Quan Angel is a 19 y.o. female who was seen by synchronous (real-time) audio-video technology on 6/18/2021 for Medication Refill and Depression (follow-up)    Assessment & Plan:   Diagnoses and all orders for this visit:    1. Anxiety  -     sertraline (ZOLOFT) 100 mg tablet; TAKE 1 TABLET BY MOUTH DAILY  -     LIPID PANEL; Future  -     METABOLIC PANEL, COMPREHENSIVE; Future  -     CBC WITH AUTOMATED DIFF; Future  -     TSH 3RD GENERATION; Future            Subjective: The patient presents for an Audio-visual teleconference appointment for anxiety follow-up care. She has chronic anxiety. She is prescribed Zoloft daily and denies any suicidal or homicidal ideation. She is compliant with the medication treatment plan. Prior to Admission medications    Medication Sig Start Date End Date Taking? Authorizing Provider   sertraline (ZOLOFT) 100 mg tablet TAKE 1 TABLET BY MOUTH DAILY 6/18/21  Yes Ronaldo Gray NP   LORazepam (ATIVAN) 0.5 mg tablet Take 1 Tab by mouth every eight (8) hours as needed for Anxiety. Max Daily Amount: 1.5 mg. 4/12/21  Yes Ronaldo Gray NP   norgestrel-ethinyl estradioL (Low-Ogestrel, 28,) 0.3-30 mg-mcg tab Take 1 Tab by mouth daily. TAKE 1 TABLET BY MOUTH DAILY 8/13/20  Yes Ronaldo Gray NP   multivitamin with iron tablet Take 1 Tab by mouth daily. Yes Provider, Historical   acetaminophen (TYLENOL) 325 mg tablet Take 650 mg by mouth every four (4) hours as needed. Yes Other, MD Arcenio   sertraline (ZOLOFT) 100 mg tablet TAKE 1 TABLET BY MOUTH DAILY 3/29/21 6/18/21  Ronaldo Gray NP   sertraline (ZOLOFT) 100 mg tablet Take 1 Tab by mouth daily. 3/30/21 6/18/21  Ronaldo Gray NP   diphenhydrAMINE (BENADRYL) 25 mg capsule Take 25 mg by mouth every six (6) hours as needed.   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 Max Daily Amount: 1.5 mg. 30 Tab 0    norgestrel-ethinyl estradioL (Low-Ogestrel, 28,) 0.3-30 mg-mcg tab Take 1 Tab by mouth daily. TAKE 1 TABLET BY MOUTH DAILY 84 Tab 6    multivitamin with iron tablet Take 1 Tab by mouth daily.  acetaminophen (TYLENOL) 325 mg tablet Take 650 mg by mouth every four (4) hours as needed.  diphenhydrAMINE (BENADRYL) 25 mg capsule Take 25 mg by mouth every six (6) hours as needed.  (Patient not taking: Reported on 6/18/2021)       Allergies   Allergen Reactions    Amoxicillin Hives    Morphine Other (comments)     Denies allergy      Nsaids (Non-Steroidal Anti-Inflammatory Drug) Other (comments)     Past Medical History:   Diagnosis Date    ADHD (attention deficit hyperactivity disorder)     Adverse effect of anesthesia 2015    dermoid cyst on right ovary (woke up had some hives after general anesthesia)    Anemia     Bicornate uterus     GERD (gastroesophageal reflux disease)     Kidney stones     Pelvic mass     Unspecified adverse effect of anesthesia     hives with general anethesia    Von Willebrand disease ([facility]     she does not have per mother       ROS     ROS:  History obtained from the patient intake forms which are reviewed with the patient  · General: negative for - chills, fever, weight changes or malaise  · HEENT: no sore throat, nasal congestion, vision problems or ear problems  · Respiratory: no cough, shortness of breath, or wheezing  · Cardiovascular: no chest pain, palpitations, or dyspnea on exertion  · Gastrointestinal: no abdominal pain, N/V, change in bowel habits, or black or bloody stools  · Musculoskeletal: no back pain, joint pain, joint stiffness, muscle pain or muscle weakness  · Neurological: no numbness, tingling, headache or dizziness  · Endo:  No polyuria or polydipsia. · : no hematuria, dysuria, frequency, hesitancy, or nocturia. · Psychological: anxiety    Objective:   No flowsheet data found. [INSTRUCTIONS:  \"[x]\" Indicates a positive item  \"[]\" Indicates a negative item  -- DELETE ALL ITEMS NOT EXAMINED]    Constitutional: [x] Appears well-developed and well-nourished [x] No apparent distress      [] Abnormal -     Mental status: [x] Alert and awake  [x] Oriented to person/place/time [x] Able to follow commands    [] Abnormal -     Eyes:   EOM    [x]  Normal    [] Abnormal -   Sclera  [x]  Normal    [] Abnormal -          Discharge [x]  None visible   [] Abnormal -     HENT: [x] Normocephalic, atraumatic  [] Abnormal -   [x] Mouth/Throat: Mucous membranes are moist    External Ears [x] Normal  [] Abnormal -    Neck: [x] No visualized mass [] Abnormal -     Pulmonary/Chest: [x] Respiratory effort normal   [x] No visualized signs of difficulty breathing or respiratory distress        [] Abnormal -      Musculoskeletal:   [x] Normal gait with no signs of ataxia         [x] Normal range of motion of neck        [] Abnormal -     Neurological:        [x] No Facial Asymmetry (Cranial nerve 7 motor function) (limited exam due to video visit)          [x] No gaze palsy        [] Abnormal -          Skin:        [x] No significant exanthematous lesions or discoloration noted on facial skin         [] Abnormal -            Psychiatric:       [x] Normal Affect [] Abnormal -        [x] No Hallucinations    Other pertinent observable physical exam findings:-        We discussed the expected course, resolution and complications of the diagnosis(es) in detail.   Medication risks, benefits, costs, interactions, and alternatives were discussed as indicated. I advised her to contact the office if her condition worsens, changes or fails to improve as anticipated. She expressed understanding with the diagnosis(es) and plan. Gisselle Quintanilla was evaluated through a synchronous (real-time) audio-video encounter. The patient (or guardian if applicable) is aware that this is a billable service. Verbal consent to proceed has been obtained within the past 12 months. The visit was conducted pursuant to the emergency declaration under the [address] [place] [address] authority and the [employer] Act. Patient identification was verified, and a caregiver was present when appropriate. The patient was located in a state where the provider was credentialed to provide care.       Deedee Monet NP

## 2021-10-01 ENCOUNTER — TELEPHONE (OUTPATIENT)
Dept: FAMILY MEDICINE CLINIC | Age: 21
End: 2021-10-01

## 2021-10-01 NOTE — TELEPHONE ENCOUNTER
Patient called the office, stated she have been feeling fatigue, vomiting, diarrhea, frequent urination and thirst. She was notified we do not have a provider in the office today. Her family have hx of diabetes. Patient was instructed to go to urgent care or ER to be evaluated. Patient verbalized her understanding.

## 2022-01-07 ENCOUNTER — OFFICE VISIT (OUTPATIENT)
Dept: FAMILY MEDICINE CLINIC | Age: 22
End: 2022-01-07
Payer: COMMERCIAL

## 2022-01-07 VITALS
RESPIRATION RATE: 16 BRPM | SYSTOLIC BLOOD PRESSURE: 143 MMHG | WEIGHT: 192.4 LBS | HEIGHT: 66 IN | DIASTOLIC BLOOD PRESSURE: 91 MMHG | HEART RATE: 84 BPM | OXYGEN SATURATION: 98 % | BODY MASS INDEX: 30.92 KG/M2 | TEMPERATURE: 97.6 F

## 2022-01-07 DIAGNOSIS — F41.9 ANXIETY: ICD-10-CM

## 2022-01-07 DIAGNOSIS — Z71.6 ENCOUNTER FOR SMOKING CESSATION COUNSELING: ICD-10-CM

## 2022-01-07 DIAGNOSIS — R03.0 ELEVATED BP WITHOUT DIAGNOSIS OF HYPERTENSION: ICD-10-CM

## 2022-01-07 DIAGNOSIS — R09.82 POST-NASAL DRIP: Primary | ICD-10-CM

## 2022-01-07 PROCEDURE — 99203 OFFICE O/P NEW LOW 30 MIN: CPT | Performed by: STUDENT IN AN ORGANIZED HEALTH CARE EDUCATION/TRAINING PROGRAM

## 2022-01-07 RX ORDER — FLUTICASONE PROPIONATE 50 MCG
2 SPRAY, SUSPENSION (ML) NASAL 2 TIMES DAILY
Qty: 1 EACH | Refills: 1 | Status: SHIPPED | OUTPATIENT
Start: 2022-01-07 | End: 2022-01-21

## 2022-01-07 NOTE — PROGRESS NOTES
Pedro Wheatley is a 19 y.o. female presenting today for Follow-up (pt states she feels like something in her throat denies pain )  . Chief Complaint   Patient presents with    Follow-up     pt states she feels like something in her throat denies pain        HPI:  Pedro Wheatley presents to the office today for throat pain. Patient has a PMHx significant for anxiety. Since the past 2 weeks she has a 'hair in her throat feeling'. It feels like something there and she can't clear it. She feels like it is where her uvula is. She has tried gargling, radha pot, water  which has not helped. She states that her symptoms are worse at night when she lies down. Has not tried any nasal spray. She tried benadryl with no relief. No difficulty breathing, fever/chills, difficulty swallowing, watery eyes or shortness of breath. She has occasional runny nose and feels congested. She was tested and was negative for covid. She does state that she interacted with her mother-in-law during Jordana time who was sick with a viral infection. And that her symptoms started right around that time. Anxiety: Patient states that she used to be on Zoloft, and it worked for her. But it made her hungry all the time which made her gain a lot of weight. Approximately 60 lbs wt gain. So she stopped taking it. She tapered off it slowly. Currently her mood is stable and can feel the need to be on any medication. She states that she has not had any panic attacks in many months. She states that she does have Ativan at home in case she experiences one. Elevated BP: Patient's blood pressure is  elevated on today's visit. She states that she did smoke a cigarette before coming in for her appointment. Smoker: Currently smokes 0.5 packs per day. She is trying to herself wean off. States that she did quit for 3 weeks in the past but then she started smoking again because of social pressure.       Review of Systems Constitutional: Negative for chills, diaphoresis, fever, malaise/fatigue and weight loss. HENT: Positive for congestion and sore throat. Negative for ear discharge, ear pain, hearing loss, nosebleeds, sinus pain and tinnitus. Eyes: Negative for blurred vision, double vision and photophobia. Respiratory: Negative for cough, sputum production, shortness of breath, wheezing and stridor. Cardiovascular: Negative for chest pain, palpitations, orthopnea, claudication and leg swelling. Gastrointestinal: Negative for abdominal pain, constipation, diarrhea, heartburn, nausea and vomiting. Genitourinary: Negative for dysuria, flank pain, frequency, hematuria and urgency. Musculoskeletal: Negative for back pain, joint pain, myalgias and neck pain. Skin: Negative for rash. Neurological: Negative for tingling, tremors, sensory change, speech change, focal weakness, seizures, weakness and headaches. Psychiatric/Behavioral: Negative for depression and suicidal ideas. The patient is nervous/anxious. All other systems reviewed and are negative.       Allergies   Allergen Reactions    Amoxicillin Hives    Morphine Other (comments)     Denies allergy      Nsaids (Non-Steroidal Anti-Inflammatory Drug) Other (comments)       PHQ Screening   3 most recent PHQ Screens 1/7/2022   Little interest or pleasure in doing things Not at all   Feeling down, depressed, irritable, or hopeless Not at all   Total Score PHQ 2 0   Trouble falling or staying asleep, or sleeping too much -   Feeling tired or having little energy -   Poor appetite, weight loss, or overeating -   Feeling bad about yourself - or that you are a failure or have let yourself or your family down -   Trouble concentrating on things such as school, work, reading, or watching TV -   Moving or speaking so slowly that other people could have noticed; or the opposite being so fidgety that others notice -   Thoughts of being better off dead, or hurting yourself in some way -   PHQ 9 Score -   How difficult have these problems made it for you to do your work, take care of your home and get along with others -       History  Past Medical History:   Diagnosis Date    ADHD (attention deficit hyperactivity disorder)     Adverse effect of anesthesia     dermoid cyst on right ovary (woke up had some hives after general anesthesia)    Anemia     Bicornate uterus     GERD (gastroesophageal reflux disease)     Kidney stones     Pelvic mass     Unspecified adverse effect of anesthesia     hives with general anethesia    Von Willebrand disease ([address]     she does not have per mother       Past Surgical History:   Procedure Laterality Date    HX COLONOSCOPY      HX LITHOTRIPSY      HX OTHER SURGICAL      BIRTH SHIRA REMOVED FROM BUTTOCKS @ 6WEEKS OLD SX.    HX OTHER SURGICAL      dermoid cyst removed       Social History     Socioeconomic History    Marital status: SINGLE     Spouse name: Not on file    Number of children: Not on file    Years of education: Not on file    Highest education level: Not on file   Occupational History    Not on file   Tobacco Use    Smoking status: Current Every Day Smoker     Packs/day: 0.50     Types: Cigarettes     Last attempt to quit: 2019     Years since quittin.3    Smokeless tobacco: Current User    Tobacco comment: vape   Vaping Use    Vaping Use: Never used   Substance and Sexual Activity    Alcohol use:  Yes     Alcohol/week: 2.0 standard drinks     Types: 2 Glasses of wine per week    Drug use: Yes     Types: Marijuana    Sexual activity: Yes     Partners: Male     Birth control/protection: Condom   Other Topics Concern    Not on file   Social History Narrative    Not on file     Social Determinants of Health     Financial Resource Strain:     Difficulty of Paying Living Expenses: Not on file   Food Insecurity:     Worried About Running Out of Food in the Last Year: Not on file    Ran Out of Food in the Last Year: Not on file   Transportation Needs:     Lack of Transportation (Medical): Not on file    Lack of Transportation (Non-Medical): Not on file   Physical Activity:     Days of Exercise per Week: Not on file    Minutes of Exercise per Session: Not on file   Stress:     Feeling of Stress : Not on file   Social Connections:     Frequency of Communication with Friends and Family: Not on file    Frequency of Social Gatherings with Friends and Family: Not on file    Attends Alevism Services: Not on file    Active Member of [address] [place] [address] [employer] or Organizations: Not on file    Attends Club or Organization Meetings: Not on file    Marital Status: Not on file   Intimate Partner Violence:     Fear of Current or Ex-Partner: Not on file    Emotionally Abused: Not on file    Physically Abused: Not on file    Sexually Abused: Not on file   Housing Stability:     Unable to Pay for Housing in the Last Year: Not on file    Number of [place] in the Last Year: Not on file    Unstable Housing in the Last Year: Not on file       Current Outpatient Medications   Medication Sig Dispense Refill    fluticasone propionate (FLONASE) 50 mcg/actuation nasal spray 2 Sprays by Both Nostrils route two (2) times a day for 14 days. 1 Each 1    LORazepam (ATIVAN) 0.5 mg tablet Take 1 Tab by mouth every eight (8) hours as needed for Anxiety. Max Daily Amount: 1.5 mg. 30 Tab 0    multivitamin with iron tablet Take 1 Tab by mouth daily.  acetaminophen (TYLENOL) 325 mg tablet Take 650 mg by mouth every four (4) hours as needed.  sertraline (ZOLOFT) 100 mg tablet TAKE 1 TABLET BY MOUTH DAILY (Patient not taking: Reported on 1/7/2022) 90 Tablet 0    norgestrel-ethinyl estradioL (Low-Ogestrel, 28,) 0.3-30 mg-mcg tab Take 1 Tab by mouth daily.  TAKE 1 TABLET BY MOUTH DAILY (Patient not taking: Reported on 1/7/2022) 84 Tab 6    diphenhydrAMINE (BENADRYL) 25 mg capsule Take 25 mg by mouth every six (6) hours as needed. (Patient not taking: Reported on 6/18/2021)           Vitals:    01/07/22 0925 01/07/22 0933   BP: (!) 141/99 (!) 143/91   Pulse: 84    Resp: 16    Temp: 97.6 °F (36.4 °C)    TempSrc: Temporal    SpO2: 98%    Weight: 192 lb 6.4 oz (87.3 kg)    Height: 5' 6\" (1.676 m)    PainSc:   0 - No pain        Physical Exam  Vitals and nursing note reviewed. Constitutional:       General: She is not in acute distress. Appearance: Normal appearance. She is not ill-appearing, toxic-appearing or diaphoretic. HENT:      Head: Normocephalic and atraumatic. Nose: Nose normal. No congestion or rhinorrhea. Mouth/Throat:      Mouth: Mucous membranes are moist.      Pharynx: Oropharynx is clear. No oropharyngeal exudate or posterior oropharyngeal erythema. Comments: Tonsils appear slightly swollen  No erythema, or pus noted. Eyes:      General: No scleral icterus. Extraocular Movements: Extraocular movements intact. Conjunctiva/sclera: Conjunctivae normal.      Pupils: Pupils are equal, round, and reactive to light. Cardiovascular:      Rate and Rhythm: Normal rate and regular rhythm. Pulses: Normal pulses. Heart sounds: No murmur heard. No gallop. Pulmonary:      Effort: Pulmonary effort is normal. No respiratory distress. Breath sounds: Normal breath sounds. No wheezing or rales. Abdominal:      General: Bowel sounds are normal. There is no distension. Palpations: Abdomen is soft. Tenderness: There is no abdominal tenderness. There is no guarding. Musculoskeletal:         General: Normal range of motion. Cervical back: Normal range of motion. Right lower leg: No edema. Left lower leg: No edema. Skin:     General: Skin is warm and dry. Coloration: Skin is not jaundiced or pale. Neurological:      General: No focal deficit present. Mental Status: She is alert and oriented to person, place, and time. Mental status is at baseline. Cranial Nerves: No cranial nerve deficit. Motor: No weakness. Gait: Gait normal.   Psychiatric:         Mood and Affect: Mood normal.         Behavior: Behavior normal.         Thought Content: Thought content normal.         Judgment: Judgment normal.         No visits with results within 3 Month(s) from this visit. Latest known visit with results is:   Office Visit on 03/17/2020   Component Date Value Ref Range Status    Color (UA POC) 03/17/2020 Dark Yellow   Final    Clarity (UA POC) 03/17/2020 Slightly Cloudy   Final    Glucose (UA POC) 03/17/2020 Negative  Negative Final    Bilirubin (UA POC) 03/17/2020 Negative  Negative Final    Ketones (UA POC) 03/17/2020 Negative  Negative Final    Specific gravity (UA POC) 03/17/2020 1.025  1.001 - 1.035 Final    Blood (UA POC) 03/17/2020 Negative  Negative Final    pH (UA POC) 03/17/2020 7.0  4.6 - 8.0 Final    Protein (UA POC) 03/17/2020 Negative  Negative Final    Urobilinogen (UA POC) 03/17/2020 0.2 mg/dL  0.2 - 1 Final    Nitrites (UA POC) 03/17/2020 Negative  Negative Final    Leukocyte esterase (UA POC) 03/17/2020 1+  Negative Final       No results found for any visits on 01/07/22. Patient Care Team:  Patient Care Team:  Kari Beal NP as PCP - General (Nurse Practitioner)  Kari Beal NP as PCP - [facility] Provider      Assessment / Plan:      ICD-10-CM ICD-9-CM    1. Post-nasal drip  R09.82 784.91 fluticasone propionate (FLONASE) 50 mcg/actuation nasal spray   2. Anxiety  F41.9 300.00    3. Elevated BP without diagnosis of hypertension  R03.0 796.2    4. Encounter for smoking cessation counseling  Z71.6 V65.42      305.1        Patient symptoms of nasal congestion and feeling of something in her throat consistent with the postnasal drip. Advised to use Flonase 2 puffs in both nostrils twice a day for 14 days. Can also take Tylenol as needed. Anxiety: Patient is currently off Zoloft.   She stopped taking it because she felt like her anxiety was stable and also because it caused a lot of weight gain. Currently she states states that she is stable. She has not had any panic attacks or severe anxiety episodes in many months. She does have Ativan at home, take if needed. She last took one 4 months ago. Elevated BP: Her blood pressure was elevated to 143/91 today. She does endorse smoking 1 cigarette before coming in for her appointment. Counseled on lifestyle modification such as quitting cigarette smoking, losing weight and DASH diet. Recheck blood pressure on next visit. Smoker: Patient is currently smoking half a pack per day. Counseled on the harms of smoking and the options available to help her quit. Patient states that she is currently off her birth control medication because she is trying to get pregnant. I discussed with her that it would be best if she quit smoking before she gets pregnant to avoid harmful effects on the baby. Patient wants to try and slowly taper down on the number of cigarettes that she takes per day. Follow-up and Dispositions    · Return in about 3 months (around 4/7/2022) for BP check, Tobacco Cessation. I asked the patient if she  had any questions and answered her  questions. The patient stated that she understands the treatment plan and agrees with the treatment plan    This document was created with a voice activated dictation system and may contain transcription errors.

## 2022-01-07 NOTE — PROGRESS NOTES
no  Lina Julio presents today for   Chief Complaint   Patient presents with    Follow-up     pt states she feels like something in her throat denies pain        Is someone accompanying this pt? no    Is the patient using any DME equipment during OV? no    Coordination of Care:  1. Have you been to the ER, urgent care clinic since your last visit? Hospitalized since your last visit? no    2. Have you seen or consulted any other health care providers outside of the [address] [place] [address] since your last visit? Include any pap smears or colon screening.  no

## 2022-01-25 ENCOUNTER — OFFICE VISIT (OUTPATIENT)
Dept: FAMILY MEDICINE CLINIC | Age: 22
End: 2022-01-25
Payer: COMMERCIAL

## 2022-01-25 VITALS
SYSTOLIC BLOOD PRESSURE: 132 MMHG | WEIGHT: 190 LBS | RESPIRATION RATE: 16 BRPM | DIASTOLIC BLOOD PRESSURE: 84 MMHG | HEIGHT: 66 IN | BODY MASS INDEX: 30.53 KG/M2 | TEMPERATURE: 98.4 F | HEART RATE: 88 BPM

## 2022-01-25 DIAGNOSIS — Z13.6 SCREENING FOR CARDIOVASCULAR CONDITION: ICD-10-CM

## 2022-01-25 DIAGNOSIS — R03.0 ELEVATED BP WITHOUT DIAGNOSIS OF HYPERTENSION: Primary | ICD-10-CM

## 2022-01-25 DIAGNOSIS — F41.9 ANXIETY: ICD-10-CM

## 2022-01-25 DIAGNOSIS — J03.90 TONSILLITIS: ICD-10-CM

## 2022-01-25 DIAGNOSIS — J35.8 TONSILLITH: ICD-10-CM

## 2022-01-25 PROCEDURE — 99214 OFFICE O/P EST MOD 30 MIN: CPT | Performed by: STUDENT IN AN ORGANIZED HEALTH CARE EDUCATION/TRAINING PROGRAM

## 2022-01-25 RX ORDER — CEFUROXIME AXETIL 250 MG/1
250 TABLET ORAL 2 TIMES DAILY
Qty: 20 TABLET | Refills: 0 | Status: SHIPPED | OUTPATIENT
Start: 2022-01-25 | End: 2022-02-04

## 2022-01-25 NOTE — PROGRESS NOTES
Bonita Lopez is a 19 y.o. female presenting today for Hoarse (feel like something in her throat all the time.)  . Chief Complaint   Patient presents with    Hoarse     feel like something in her throat all the time. HPI:  Bonita Lopez presents to the office today for recurrent tonsillar stones. Patient has a PMHx significant for anxiety. Patient presented last visit on 1/7/2022. At that time she had been having an occasional runny nose, feeling congested with a 'hair in her throat' kind of feeling. She had tried gargling, Yumiko pot, water floss  which had not helped. She had tested negative for COVID-19. At the time, her symptoms appeared viral with sore throat secondary to postnasal drip. She was prescribed Flonase. However, since then she states that her symptoms have worsened. Now she is having recurrent tonsillar stones. States that she has had tonsillar stones in the past, but now there is an increased frequency - she has had to remove multiple stones in a day. She is also reporting bad breath. She has been doing salt water gargling twice a day, brushing her teeth multiple times a day and trying to keep good oral hygiene. She denies any fever, chills, shortness of breath, runny nose. Elevated BP: Patient's BP was elevated on this visit and prior. On repeat checking her blood pressure was 132/84. Smoker: Currently smokes 0.5 packs per day. She is trying to herself wean off. States that she did quit for 3 weeks in the past but then she started smoking again because of social pressure. Review of Systems   Constitutional: Negative for chills, diaphoresis, fever, malaise/fatigue and weight loss. HENT: Positive for sore throat. Negative for congestion, ear discharge, ear pain, hearing loss, nosebleeds, sinus pain and tinnitus. Eyes: Negative for blurred vision, double vision and photophobia.    Respiratory: Negative for cough, sputum production, shortness of breath, wheezing and stridor. Cardiovascular: Negative for chest pain, palpitations, orthopnea, claudication and leg swelling. Gastrointestinal: Negative for abdominal pain, constipation, diarrhea, heartburn, nausea and vomiting. Genitourinary: Negative for dysuria, flank pain, frequency, hematuria and urgency. Musculoskeletal: Negative for back pain, joint pain, myalgias and neck pain. Skin: Negative for rash. Neurological: Negative for tingling, tremors, sensory change, speech change, focal weakness, seizures, weakness and headaches. Psychiatric/Behavioral: Negative for depression and suicidal ideas. The patient is not nervous/anxious. All other systems reviewed and are negative.       Allergies   Allergen Reactions    Penicillin Rash    Amoxicillin Hives    Morphine Other (comments)     Denies allergy      Nsaids (Non-Steroidal Anti-Inflammatory Drug) Other (comments)       PHQ Screening   3 most recent PHQ Screens 1/25/2022   Little interest or pleasure in doing things Not at all   Feeling down, depressed, irritable, or hopeless Not at all   Total Score PHQ 2 0   Trouble falling or staying asleep, or sleeping too much -   Feeling tired or having little energy -   Poor appetite, weight loss, or overeating -   Feeling bad about yourself - or that you are a failure or have let yourself or your family down -   Trouble concentrating on things such as school, work, reading, or watching TV -   Moving or speaking so slowly that other people could have noticed; or the opposite being so fidgety that others notice -   Thoughts of being better off dead, or hurting yourself in some way -   PHQ 9 Score -   How difficult have these problems made it for you to do your work, take care of your home and get along with others -       History  Past Medical History:   Diagnosis Date    ADHD (attention deficit hyperactivity disorder)     Adverse effect of anesthesia 2015    dermoid cyst on right ovary (woke up had some hives after general anesthesia)    Anemia     Bicornate uterus     GERD (gastroesophageal reflux disease)     Kidney stones     Pelvic mass     Unspecified adverse effect of anesthesia     hives with general anethesia    Von Willebrand disease ([address]     she does not have per mother       Past Surgical History:   Procedure Laterality Date    HX COLONOSCOPY      HX LITHOTRIPSY  2015    HX OTHER SURGICAL      BIRTH SHIRA REMOVED FROM BUTTOCKS @ 6WEEKS OLD SX.    HX OTHER SURGICAL      dermoid cyst removed       Social History     Socioeconomic History    Marital status: SINGLE     Spouse name: Not on file    Number of children: Not on file    Years of education: Not on file    Highest education level: Not on file   Occupational History    Not on file   Tobacco Use    Smoking status: Current Every Day Smoker     Packs/day: 0.50     Types: Cigarettes     Last attempt to quit: 2019     Years since quittin.3    Smokeless tobacco: Current User    Tobacco comment: vape   Vaping Use    Vaping Use: Never used   Substance and Sexual Activity    Alcohol use: Yes     Alcohol/week: 2.0 standard drinks     Types: 2 Glasses of wine per week    Drug use: Yes     Types: Marijuana    Sexual activity: Yes     Partners: Male     Birth control/protection: Condom   Other Topics Concern    Not on file   Social History Narrative    Not on file     Social Determinants of Health     Financial Resource Strain:     Difficulty of Paying Living Expenses: Not on file   Food Insecurity:     Worried About Running Out of Food in the Last Year: Not on file    Grant of Food in the Last Year: Not on file   Transportation Needs:     Lack of Transportation (Medical): Not on file    Lack of Transportation (Non-Medical):  Not on file   Physical Activity:     Days of Exercise per Week: Not on file    Minutes of Exercise per Session: Not on file   Stress:     Feeling of Stress : Not on file   Social Connections:  Frequency of Communication with Friends and Family: Not on file    Frequency of Social Gatherings with Friends and Family: Not on file    Attends [employer] Services: Not on file    Active Member of Clubs or Organizations: Not on file    Attends Club or Organization Meetings: Not on file    Marital Status: Not on file   Intimate Partner Violence:     Fear of Current or Ex-Partner: Not on file    Emotionally Abused: Not on file    Physically Abused: Not on file    Sexually Abused: Not on file   Housing Stability:     Unable to Pay for Housing in the Last Year: Not on file    Number of [place] in the Last Year: Not on file    Unstable Housing in the Last Year: Not on file       Current Outpatient Medications   Medication Sig Dispense Refill    cefUROXime (CEFTIN) 250 mg tablet Take 1 Tablet by mouth two (2) times a day for 10 days. 20 Tablet 0    LORazepam (ATIVAN) 0.5 mg tablet Take 1 Tab by mouth every eight (8) hours as needed for Anxiety. Max Daily Amount: 1.5 mg. 30 Tab 0    multivitamin with iron tablet Take 1 Tab by mouth daily.  acetaminophen (TYLENOL) 325 mg tablet Take 650 mg by mouth every four (4) hours as needed.  sertraline (ZOLOFT) 100 mg tablet TAKE 1 TABLET BY MOUTH DAILY (Patient not taking: Reported on 1/7/2022) 90 Tablet 0    norgestrel-ethinyl estradioL (Low-Ogestrel, 28,) 0.3-30 mg-mcg tab Take 1 Tab by mouth daily. TAKE 1 TABLET BY MOUTH DAILY (Patient not taking: Reported on 1/7/2022) 84 Tab 6    diphenhydrAMINE (BENADRYL) 25 mg capsule Take 25 mg by mouth every six (6) hours as needed. (Patient not taking: Reported on 6/18/2021)           Vitals:    01/25/22 1019 01/25/22 1113   BP: (!) 143/95 132/84   Pulse: 88    Resp: 16    Temp: 98.4 °F (36.9 °C)    TempSrc: Oral    Weight: 190 lb (86.2 kg)    Height: 5' 6\" (1.676 m)    PainSc:   0 - No pain        Physical Exam  Vitals and nursing note reviewed.    Constitutional:       General: She is not in acute distress. Appearance: Normal appearance. She is not ill-appearing, toxic-appearing or diaphoretic. HENT:      Head: Normocephalic and atraumatic. Nose: Nose normal. No congestion or rhinorrhea. Mouth/Throat:      Mouth: Mucous membranes are moist.      Pharynx: Oropharynx is clear. Comments: Bilateral tonsillar enlargement. No tonsilloliths noted. Eyes:      General: No scleral icterus. Extraocular Movements: Extraocular movements intact. Conjunctiva/sclera: Conjunctivae normal.      Pupils: Pupils are equal, round, and reactive to light. Cardiovascular:      Rate and Rhythm: Normal rate and regular rhythm. Pulses: Normal pulses. Heart sounds: No murmur heard. No gallop. Pulmonary:      Effort: Pulmonary effort is normal. No respiratory distress. Breath sounds: Normal breath sounds. No wheezing or rales. Abdominal:      General: Bowel sounds are normal. There is no distension. Palpations: Abdomen is soft. Tenderness: There is no abdominal tenderness. There is no guarding. Musculoskeletal:         General: Normal range of motion. Cervical back: Normal range of motion. Right lower leg: No edema. Left lower leg: No edema. Skin:     General: Skin is warm and dry. Coloration: Skin is not jaundiced or pale. Neurological:      General: No focal deficit present. Mental Status: She is alert and oriented to person, place, and time. Mental status is at baseline. Cranial Nerves: No cranial nerve deficit. Motor: No weakness. Gait: Gait normal.   Psychiatric:         Mood and Affect: Mood normal.         Behavior: Behavior normal.         Thought Content: Thought content normal.         Judgment: Judgment normal.         No visits with results within 3 Month(s) from this visit.    Latest known visit with results is:   Office Visit on 03/17/2020   Component Date Value Ref Range Status    Color (UA POC) 03/17/2020 Dark Yellow   Final    Clarity (UA POC) 03/17/2020 Slightly Cloudy   Final    Glucose (UA POC) 03/17/2020 Negative  Negative Final    Bilirubin (UA POC) 03/17/2020 Negative  Negative Final    Ketones (UA POC) 03/17/2020 Negative  Negative Final    Specific gravity (UA POC) 03/17/2020 1.025  1.001 - 1.035 Final    Blood (UA POC) 03/17/2020 Negative  Negative Final    pH (UA POC) 03/17/2020 7.0  4.6 - 8.0 Final    Protein (UA POC) 03/17/2020 Negative  Negative Final    Urobilinogen (UA POC) 03/17/2020 0.2 mg/dL  0.2 - 1 Final    Nitrites (UA POC) 03/17/2020 Negative  Negative Final    Leukocyte esterase (UA POC) 03/17/2020 1+  Negative Final       No results found for any visits on 01/25/22. Patient Care Team:  Patient Care Team:  Brielle Evans NP as PCP - General (Nurse Practitioner)  Brielle Evans NP as PCP - [facility] Provider      Assessment / Plan:      ICD-10-CM ICD-9-CM    1. Elevated BP without diagnosis of hypertension  R03.0 796.2    2. Tonsillith  J35.8 474.8 REFERRAL TO ENT-OTOLARYNGOLOGY   3. Screening for cardiovascular condition  Z13.6 V81.2    4. Anxiety  F41.9 300.00    5. Tonsillitis  J03.90 463 cefUROXime (CEFTIN) 250 mg tablet       Recurrent tonsillar stones: Patient reporting multiple tonsil stones, halitosis after recent viral infection. Unfortunately unable to do a throat culture in the clinic. However, will treat with antibiotic as she has worsening symptoms. Patient is allergic to penicillins-she developed hives with amoxicillin in the past.  Will prescribe a cephalosporin. Patient counseled that if she develops an allergy to contact me ASAP. Refer to ENT since patient has had multiple episodes of this and may be a candidate for tonsillectomy. Anxiety: Used to be Zoloft in the past but she stopped taking it due to weight gain. Currently is stable. Elevated BP: BP elevated in last 2 visits. Counseled on lifestyle modification and DASH diet. Next visit: Address health care gaps. I asked the patient if she  had any questions and answered her  questions. The patient stated that she understands the treatment plan and agrees with the treatment plan    This document was created with a voice activated dictation system and may contain transcription errors.

## 2022-01-25 NOTE — PROGRESS NOTES
Josi Cool presents today for   Chief Complaint   Patient presents with   [facility]     feel like something in her throat all the time. Is someone accompanying this pt? no    Is the patient using any DME equipment during [address]? no    Depression Screening:  3 most recent PHQ Screens 1/25/2022   Little interest or pleasure in doing things Not at all   Feeling down, depressed, irritable, or hopeless Not at all   Total Score PHQ 2 0   Trouble falling or staying asleep, or sleeping too much -   Feeling tired or having little energy -   Poor appetite, weight loss, or overeating -   Feeling bad about yourself - or that you are a failure or have let yourself or your family down -   Trouble concentrating on things such as school, work, reading, or watching TV -   Moving or speaking so slowly that other people could have noticed; or the opposite being so fidgety that others notice -   Thoughts of being better off dead, or hurting yourself in some way -   PHQ 9 Score -   How difficult have these problems made it for you to do your work, take care of your home and get along with others -       Learning Assessment:  Learning Assessment 6/6/2019   PRIMARY LEARNER Patient   HIGHEST LEVEL OF EDUCATION - PRIMARY LEARNER  SOME COLLEGE   BARRIERS PRIMARY LEARNER NONE   PRIMARY LANGUAGE ENGLISH   LEARNER PREFERENCE PRIMARY DEMONSTRATION   ANSWERED BY self   RELATIONSHIP SELF       Health Maintenance reviewed and discussed and ordered per Provider. Health Maintenance Due   Topic Date Due    Hepatitis C Screening  Never done    Pneumococcal 0-64 years (1 of 2 - PPSV23) Never done    HPV Age 9Y-34Y (1 - 2-dose series) Never done    DTaP/Tdap/Td series (1 - Tdap) Never done    Pap Smear  Never done    Flu Vaccine (1) 09/01/2021    COVID-19 Vaccine (3 - Booster for Moderna series) 10/07/2021   . Coordination of Care:  1. Have you been to the ER, urgent care clinic since your last visit?  Hospitalized since your last visit? no    2. Have you seen or consulted any other health care providers outside of the [address] [place] [address] since your last visit? Include any pap smears or colon screening.  no

## 2022-03-04 ENCOUNTER — APPOINTMENT (OUTPATIENT)
Dept: CT IMAGING | Age: 22
End: 2022-03-04
Attending: EMERGENCY MEDICINE
Payer: COMMERCIAL

## 2022-03-04 ENCOUNTER — VIRTUAL VISIT (OUTPATIENT)
Dept: FAMILY MEDICINE CLINIC | Age: 22
End: 2022-03-04
Payer: COMMERCIAL

## 2022-03-04 ENCOUNTER — HOSPITAL ENCOUNTER (EMERGENCY)
Age: 22
Discharge: HOME | End: 2022-03-04
Attending: EMERGENCY MEDICINE
Payer: COMMERCIAL

## 2022-03-04 VITALS
DIASTOLIC BLOOD PRESSURE: 79 MMHG | TEMPERATURE: 98.2 F | WEIGHT: 172 LBS | SYSTOLIC BLOOD PRESSURE: 113 MMHG | OXYGEN SATURATION: 100 % | HEART RATE: 90 BPM | RESPIRATION RATE: 18 BRPM | HEIGHT: 66 IN | BODY MASS INDEX: 27.64 KG/M2

## 2022-03-04 DIAGNOSIS — R00.2 PALPITATIONS: ICD-10-CM

## 2022-03-04 DIAGNOSIS — D68.00 VON WILLEBRAND DISEASE: ICD-10-CM

## 2022-03-04 DIAGNOSIS — F41.9 ANXIETY: ICD-10-CM

## 2022-03-04 DIAGNOSIS — R07.9 CHEST PAIN, UNSPECIFIED TYPE: Primary | ICD-10-CM

## 2022-03-04 DIAGNOSIS — N20.0 KIDNEY STONE: ICD-10-CM

## 2022-03-04 DIAGNOSIS — I49.3 PREMATURE VENTRICULAR CONTRACTIONS (PVCS) (VPCS): Primary | ICD-10-CM

## 2022-03-04 DIAGNOSIS — R07.89 CHEST PRESSURE: ICD-10-CM

## 2022-03-04 DIAGNOSIS — R00.0 FAST HEART BEAT: ICD-10-CM

## 2022-03-04 DIAGNOSIS — R03.0 ELEVATED BP WITHOUT DIAGNOSIS OF HYPERTENSION: ICD-10-CM

## 2022-03-04 DIAGNOSIS — G47.00 INSOMNIA, UNSPECIFIED TYPE: ICD-10-CM

## 2022-03-04 LAB
ALBUMIN SERPL-MCNC: 3.6 G/DL (ref 3.4–5)
ALBUMIN/GLOB SERPL: 0.9 {RATIO} (ref 0.8–1.7)
ALP SERPL-CCNC: 100 U/L (ref 45–117)
ALT SERPL-CCNC: 27 U/L (ref 13–56)
ANION GAP SERPL CALC-SCNC: 6 MMOL/L (ref 3–18)
AST SERPL-CCNC: 24 U/L (ref 10–38)
BASOPHILS # BLD: 0 K/UL (ref 0–0.1)
BASOPHILS NFR BLD: 0 % (ref 0–2)
BILIRUB SERPL-MCNC: 0.4 MG/DL (ref 0.2–1)
BNP SERPL-MCNC: 63 PG/ML (ref 0–450)
BUN SERPL-MCNC: 10 MG/DL (ref 7–18)
BUN/CREAT SERPL: 14 (ref 12–20)
CALCIUM SERPL-MCNC: 8.6 MG/DL (ref 8.5–10.1)
CHLORIDE SERPL-SCNC: 109 MMOL/L (ref 100–111)
CO2 SERPL-SCNC: 23 MMOL/L (ref 21–32)
CREAT SERPL-MCNC: 0.74 MG/DL (ref 0.6–1.3)
D DIMER PPP FEU-MCNC: 0.85 UG/ML(FEU)
DIFFERENTIAL METHOD BLD: NORMAL
EOSINOPHIL # BLD: 0.1 K/UL (ref 0–0.4)
EOSINOPHIL NFR BLD: 1 % (ref 0–5)
ERYTHROCYTE [DISTWIDTH] IN BLOOD BY AUTOMATED COUNT: 11.7 % (ref 11.6–14.5)
GLOBULIN SER CALC-MCNC: 4.1 G/DL (ref 2–4)
GLUCOSE SERPL-MCNC: 82 MG/DL (ref 74–99)
HCG SERPL QL: NEGATIVE
HCT VFR BLD AUTO: 42.1 % (ref 35–45)
HGB BLD-MCNC: 14.2 G/DL (ref 12–16)
IMM GRANULOCYTES # BLD AUTO: 0 K/UL (ref 0–0.04)
IMM GRANULOCYTES NFR BLD AUTO: 0 % (ref 0–0.5)
LYMPHOCYTES # BLD: 3 K/UL (ref 0.9–3.6)
LYMPHOCYTES NFR BLD: 26 % (ref 21–52)
MAGNESIUM SERPL-MCNC: 1.7 MG/DL (ref 1.6–2.6)
MCH RBC QN AUTO: 29.6 PG (ref 24–34)
MCHC RBC AUTO-ENTMCNC: 33.7 G/DL (ref 31–37)
MCV RBC AUTO: 87.7 FL (ref 78–100)
MONOCYTES # BLD: 0.9 K/UL (ref 0.05–1.2)
MONOCYTES NFR BLD: 8 % (ref 3–10)
NEUTS SEG # BLD: 7.7 K/UL (ref 1.8–8)
NEUTS SEG NFR BLD: 66 % (ref 40–73)
NRBC # BLD: 0 K/UL (ref 0–0.01)
NRBC BLD-RTO: 0 PER 100 WBC
PLATELET # BLD AUTO: 268 K/UL (ref 135–420)
PMV BLD AUTO: 10.9 FL (ref 9.2–11.8)
POTASSIUM SERPL-SCNC: 3.7 MMOL/L (ref 3.5–5.5)
PROT SERPL-MCNC: 7.7 G/DL (ref 6.4–8.2)
RBC # BLD AUTO: 4.8 M/UL (ref 4.2–5.3)
SODIUM SERPL-SCNC: 138 MMOL/L (ref 136–145)
TROPONIN-HIGH SENSITIVITY: 5 NG/L (ref 0–54)
WBC # BLD AUTO: 11.8 K/UL (ref 4.6–13.2)

## 2022-03-04 PROCEDURE — 84484 ASSAY OF TROPONIN QUANT: CPT

## 2022-03-04 PROCEDURE — 93005 ELECTROCARDIOGRAM TRACING: CPT

## 2022-03-04 PROCEDURE — 85379 FIBRIN DEGRADATION QUANT: CPT

## 2022-03-04 PROCEDURE — 71275 CT ANGIOGRAPHY CHEST: CPT

## 2022-03-04 PROCEDURE — 99212 OFFICE O/P EST SF 10 MIN: CPT | Performed by: NURSE PRACTITIONER

## 2022-03-04 PROCEDURE — 83880 ASSAY OF NATRIURETIC PEPTIDE: CPT

## 2022-03-04 PROCEDURE — 85025 COMPLETE CBC W/AUTO DIFF WBC: CPT

## 2022-03-04 PROCEDURE — 80053 COMPREHEN METABOLIC PANEL: CPT

## 2022-03-04 PROCEDURE — 84703 CHORIONIC GONADOTROPIN ASSAY: CPT

## 2022-03-04 PROCEDURE — 74011000636 HC RX REV CODE- 636: Performed by: EMERGENCY MEDICINE

## 2022-03-04 PROCEDURE — 83735 ASSAY OF MAGNESIUM: CPT

## 2022-03-04 PROCEDURE — 96365 THER/PROPH/DIAG IV INF INIT: CPT

## 2022-03-04 PROCEDURE — 99285 EMERGENCY DEPT VISIT HI MDM: CPT

## 2022-03-04 PROCEDURE — 74011250636 HC RX REV CODE- 250/636: Performed by: EMERGENCY MEDICINE

## 2022-03-04 RX ORDER — LORAZEPAM 0.5 MG/1
0.5 TABLET ORAL
Qty: 12 TABLET | Refills: 0 | Status: SHIPPED | OUTPATIENT
Start: 2022-03-04 | End: 2022-03-08

## 2022-03-04 RX ORDER — SERTRALINE HYDROCHLORIDE 25 MG/1
25 TABLET, FILM COATED ORAL DAILY
COMMUNITY
End: 2022-06-29 | Stop reason: SDUPTHER

## 2022-03-04 RX ORDER — MAGNESIUM SULFATE HEPTAHYDRATE 40 MG/ML
2 INJECTION, SOLUTION INTRAVENOUS
Status: COMPLETED | OUTPATIENT
Start: 2022-03-04 | End: 2022-03-04

## 2022-03-04 RX ADMIN — IOPAMIDOL 80 ML: 755 INJECTION, SOLUTION INTRAVENOUS at 16:47

## 2022-03-04 RX ADMIN — MAGNESIUM SULFATE 2 G: 2 INJECTION INTRAVENOUS at 16:33

## 2022-03-04 RX ADMIN — SODIUM CHLORIDE 500 ML: 900 INJECTION, SOLUTION INTRAVENOUS at 16:40

## 2022-03-04 NOTE — PROGRESS NOTES
Rut Caldera is a 19 y.o. female who was seen by synchronous (real-time) audio-video technology on 3/4/2022 for Panic Attack and Palpitations        Assessment & Plan:   Diagnoses and all orders for this visit:    1. Chest pain, unspecified type  -     REFERRAL TO CARDIOLOGY    2. Palpitations  -     REFERRAL TO CARDIOLOGY    3. Chest pressure  -     REFERRAL TO CARDIOLOGY    4. Anxiety          Subjective: The patient presents for an Audio-visual teleconference appointment for chest pain, chest pressure and palpitation. Patient was seen at [facility] emergency department approximately 1 week ago. Patient was previously prescribed propanolol for complaints of palpitation. Stopped taking thepropanolol approximately 1 year ago. She previously took propranolol for approximately 6 to 8 months and notes that her palpitation symptoms were unchanged. She stopped taking the medication secondary to feeling lightheaded. She notes that her quality of life has decreased. heart palpitations and arrhythmia is more excessive in addition she has chest pain and chest pressure. Feels like the chest pain is progressively worse and the PVCs are more frequently. .  Leans back to feel like she is opening up her chest.  She sleeps approximately 3 hours of sleep per night secondary to her heart symptoms. When seen in the ED she was given a dose of Ativan which did not help. She does not believe the symptoms are associated with anxiety. Previously she stopped taking her Zoloft about 3 months ago secondary to a 60 pound weight gain while taking the medication. She lost 30 pounds but notes her anxiety started kicking in. She restarted the Zoloft 25 mg approximately 1 month ago and feels her anxiety symptoms are managed. Prior to Admission medications    Medication Sig Start Date End Date Taking? Authorizing Provider   LORazepam (ATIVAN) 0.5 mg tablet Take 1 Tab by mouth every eight (8) hours as needed for Anxiety. Max Daily Amount: 1.5 mg. 4/12/21  Yes Gopi Saleh NP   acetaminophen (TYLENOL) 325 mg tablet Take 650 mg by mouth every four (4) hours as needed. Yes Other, MD Arcenio   sertraline (ZOLOFT) 100 mg tablet TAKE 1 TABLET BY MOUTH DAILY  Patient not taking: Reported on 1/7/2022 6/18/21   Gopi Saleh NP   norgestrel-ethinyl estradioL (Low-Ogestrel, 28,) 0.3-30 mg-mcg tab Take 1 Tab by mouth daily. TAKE 1 TABLET BY MOUTH DAILY  Patient not taking: Reported on 1/7/2022 8/13/20   Gopi Saleh NP   diphenhydrAMINE (BENADRYL) 25 mg capsule Take 25 mg by mouth every six (6) hours as needed. Patient not taking: Reported on 6/18/2021  3/4/22  Provider, Historical   multivitamin with iron tablet Take 1 Tab by mouth daily. Patient not taking: Reported on 3/4/2022    Provider, Historical     Patient Active Problem List   Diagnosis Code    [facility] D68.0    Kidney stone N20.0    Fast heart beat R00.0    Elevated BP without diagnosis of hypertension R03.0    Anxiety F41.9     Patient Active Problem List    Diagnosis Date Noted    Elevated BP without diagnosis of hypertension 01/07/2022    Anxiety 01/07/2022    Fast heart beat 09/24/2019    Kidney stone 02/22/2017    Von Willebrand disease ([facility] 04/10/2014     Current Outpatient Medications   Medication Sig Dispense Refill    LORazepam (ATIVAN) 0.5 mg tablet Take 1 Tab by mouth every eight (8) hours as needed for Anxiety. Max Daily Amount: 1.5 mg. 30 Tab 0    acetaminophen (TYLENOL) 325 mg tablet Take 650 mg by mouth every four (4) hours as needed.  sertraline (ZOLOFT) 100 mg tablet TAKE 1 TABLET BY MOUTH DAILY (Patient not taking: Reported on 1/7/2022) 90 Tablet 0    norgestrel-ethinyl estradioL (Low-Ogestrel, 28,) 0.3-30 mg-mcg tab Take 1 Tab by mouth daily. TAKE 1 TABLET BY MOUTH DAILY (Patient not taking: Reported on 1/7/2022) 84 Tab 6    multivitamin with iron tablet Take 1 Tab by mouth daily.  (Patient not taking: Reported on 3/4/2022)       Allergies   Allergen Reactions    Penicillin Rash    Amoxicillin Hives    Morphine Other (comments)     Denies allergy      Nsaids (Non-Steroidal Anti-Inflammatory Drug) Other (comments)       Review of Systems   Constitutional: Negative for chills and fever. Respiratory: Negative for cough, sputum production and shortness of breath. Cardiovascular: Positive for chest pain and palpitations. Chest pressure     Gastrointestinal: Negative for abdominal pain, constipation, diarrhea, nausea and vomiting. Genitourinary: Negative for dysuria. Musculoskeletal: Negative for back pain, joint pain and myalgias. Neurological: Negative for dizziness and headaches. Psychiatric/Behavioral: Negative for substance abuse. The patient is not nervous/anxious. Objective:   No flowsheet data found.      [INSTRUCTIONS:  \"[x]\" Indicates a positive item  \"[]\" Indicates a negative item  -- DELETE ALL ITEMS NOT EXAMINED]    Constitutional: [x] Appears well-developed and well-nourished [x] No apparent distress      [] Abnormal -     Mental status: [x] Alert and awake  [x] Oriented to person/place/time [x] Able to follow commands    [] Abnormal -     Eyes:   EOM    [x]  Normal    [] Abnormal -   Sclera  [x]  Normal    [] Abnormal -          Discharge [x]  None visible   [] Abnormal -     HENT: [x] Normocephalic, atraumatic  [] Abnormal -   [x] Mouth/Throat: Mucous membranes are moist    External Ears [x] Normal  [] Abnormal -    Neck: [x] No visualized mass [] Abnormal -     Pulmonary/Chest: [x] Respiratory effort normal   [x] No visualized signs of difficulty breathing or respiratory distress        [] Abnormal -      Musculoskeletal:   [x] Normal gait with no signs of ataxia         [x] Normal range of motion of neck        [] Abnormal -     Neurological:        [x] No Facial Asymmetry (Cranial nerve 7 motor function) (limited exam due to video visit)          [x] No gaze palsy        [] Abnormal -          Skin:        [x] No significant exanthematous lesions or discoloration noted on facial skin         [] Abnormal -            Psychiatric:       [x] Normal Affect [] Abnormal -        [x] No Hallucinations    Other pertinent observable physical exam findings:-        We discussed the expected course, resolution and complications of the diagnosis(es) in detail. Medication risks, benefits, costs, interactions, and alternatives were discussed as indicated. I advised her to contact the office if her condition worsens, changes or fails to improve as anticipated. She expressed understanding with the diagnosis(es) and plan. Ty Travis, was evaluated through a synchronous (real-time) audio-video encounter. The patient (or guardian if applicable) is aware that this is a billable service, which includes applicable co-pays. Verbal consent to proceed has been obtained. The visit was conducted pursuant to the emergency declaration under the [address] [place] [address] authority and the [employer] General Act. Patient identification was verified, and a caregiver was present when appropriate. The patient was located at home in a state where the provider was licensed to provide care.       Yue Cunningham NP

## 2022-03-04 NOTE — ED TRIAGE NOTES
Pt reports h/o PVC's. States for the past 3-4 days she has been experiencing palpitations that she describes as \"unbearable\". Reports chest pain & pressure.

## 2022-03-04 NOTE — ED PROVIDER NOTES
EMERGENCY DEPARTMENT HISTORY AND PHYSICAL EXAM    3:29 PM    Date: 3/4/2022  Patient Name: Ang Chambers    History of Presenting Illness     Chief Complaint   Patient presents with    Palpitations       History Provided By: Patient  Location/Duration/Severity/Modifying factors   47-year-old female who presented to the emergency department with a chief complaint of palpitations, frequent PVCs and chest pressure. She rates the pressure is intermittent and mild at present. She also rates having the PVCs is very often to the point where the keeping her up at night and irritating her during the daytime. She has had these for a long time but they are more frequent than usual today. She had echocardiogram done a couple years ago which she was told was normal.  She was on propranolol and reports that that did not help her very much at all. She called her PCP today who referred her to the emergency room. She denies any shortness of breath nausea vomiting or diaphoresis. She denies any diarrhea. No possibility of pregnancy. Reports that she came off her birth control around 3 months ago and no history of DVT or PE in the past.  She was previously followed by cardiology but has not seen them in about 3 years. She reports that her symptoms are overall fairly severe although the chest pain itself is mild. The PVCs seem to be worse at night. PCP: Donaldo Tran NP    Current Outpatient Medications   Medication Sig Dispense Refill    sertraline (Zoloft) 25 mg tablet Take 25 mg by mouth daily.  LORazepam (Ativan) 0.5 mg tablet Take 1 Tablet by mouth every eight (8) hours as needed for Anxiety (Sleep) for up to 4 days.  Max Daily Amount: 1.5 mg. 12 Tablet 0       Past History     Past Medical History:  Past Medical History:   Diagnosis Date    ADHD (attention deficit hyperactivity disorder)     Adverse effect of anesthesia 2015    dermoid cyst on right ovary (woke up had some hives after general anesthesia)    Anemia     Bicornate uterus     GERD (gastroesophageal reflux disease)     Kidney stones     Pelvic mass     Unspecified adverse effect of anesthesia     hives with general anethesia    Von Willebrand disease ([address]     she does not have per mother       Past Surgical History:  Past Surgical History:   Procedure Laterality Date    HX COLONOSCOPY      HX LITHOTRIPSY  2015    HX OTHER SURGICAL      BIRTH SHIRA REMOVED FROM BUTTOCKS @ 6WEEKS OLD SX.    HX OTHER SURGICAL  2015    dermoid cyst removed       Family History:  Family History   Problem Relation Age of Onset    Heart Disease Maternal Grandfather     Heart Disease Paternal Grandmother     Diabetes Paternal Grandfather     No Known Problems Mother     No Known Problems Father     Heart Attack Paternal Uncle        Social History:  Social History     Tobacco Use    Smoking status: Current Every Day Smoker     Packs/day: 0.50     Types: Cigarettes    Smokeless tobacco: Current User    Tobacco comment: vape   Vaping Use    Vaping Use: Never used   Substance Use Topics    Alcohol use: Yes     Alcohol/week: 2.0 standard drinks     Types: 2 Glasses of wine per week    Drug use: Yes     Types: Marijuana       Allergies: Allergies   Allergen Reactions    Penicillin Rash    Amoxicillin Hives    Morphine Other (comments)     Denies allergy      Nsaids (Non-Steroidal Anti-Inflammatory Drug) Other (comments)       I reviewed and confirmed the above information with patient and updated as necessary. Review of Systems     Review of Systems   Constitutional: Negative for chills and fever. HENT: Negative for congestion, rhinorrhea, sinus pressure and sneezing. Eyes: Negative for visual disturbance. Respiratory: Negative for cough and shortness of breath. Cardiovascular: Positive for chest pain and palpitations. Negative for leg swelling. Gastrointestinal: Negative for abdominal pain, diarrhea, nausea and vomiting. Genitourinary: Negative for dysuria, frequency and urgency. Musculoskeletal: Negative for back pain and neck pain. Skin: Negative for rash. Neurological: Negative for syncope, numbness and headaches. Physical Exam     Visit Vitals  /79   Pulse 90   Temp 98.2 °F (36.8 °C)   Resp 18   Ht 5' 6\" (1.676 m)   Wt 78 kg (172 lb)   SpO2 100%   BMI 27.76 kg/m²       Physical Exam  Vitals and nursing note reviewed. Constitutional:       General: She is not in acute distress. Appearance: Normal appearance. She is normal weight. HENT:      Head: Normocephalic and atraumatic. Right Ear: External ear normal.      Left Ear: External ear normal.      Nose: Nose normal. No congestion or rhinorrhea. Mouth/Throat:      Mouth: Mucous membranes are moist.      Pharynx: Oropharynx is clear. No posterior oropharyngeal erythema. Eyes:      Conjunctiva/sclera: Conjunctivae normal.   Cardiovascular:      Rate and Rhythm: Normal rate and regular rhythm. Pulses: Normal pulses. Heart sounds: Normal heart sounds. No murmur heard. Comments: Mild sinus irregularity, occasional ectopy with no murmurs  Pulmonary:      Effort: Pulmonary effort is normal.      Breath sounds: Normal breath sounds. No wheezing, rhonchi or rales. Abdominal:      General: Abdomen is flat. Tenderness: There is no abdominal tenderness. There is no guarding or rebound. Musculoskeletal:         General: No swelling or tenderness. Normal range of motion. Cervical back: Normal range of motion and neck supple. Right lower leg: No edema. Left lower leg: No edema. Skin:     General: Skin is warm and dry. Capillary Refill: Capillary refill takes less than 2 seconds. Findings: No rash. Neurological:      General: No focal deficit present. Mental Status: She is alert.          Diagnostic Study Results     Labs -  No results found for this or any previous visit (from the past 24 hour(s)). Radiologic Studies -   CTA CHEST W OR W WO CONT   Final Result   No pulmonary embolism, aortic dissection or acute pulmonary disease. Negative study. Medical Decision Making   I am the first provider for this patient. I reviewed the vital signs, available nursing notes, past medical history, past surgical history, family history and social history. Vital Signs-Reviewed the patient's vital signs. EKG: Normal sinus rhythm, rate of 81. Normal [place], QRS and QTc intervals. Normal axis. No ST segment elevation or depression. Overall normal sinus rhythm and normal EKG. Records Reviewed: Nursing Notes, Old Medical Records, Previous electrocardiograms, Previous Radiology Studies and Previous Laboratory Studies (Time of Review: 3:29 PM)      Provider Notes (Medical Decision Making):   MDM  Number of Diagnoses or Management Options  Anxiety  Elevated BP without diagnosis of hypertension  Fast heart beat  Insomnia, unspecified type  Kidney stone  Premature ventricular contractions (PVCs) (VPCs)  Von Willebrand disease ([facility] [address]  Diagnosis management comments: 75-year-old female presenting with a chief complaint of palpitations. She has been diagnosed with PVCs in the past reports that lately they have been very severe. She is not slept much and she is under stress. She is having mild chest pain declines any intervention for it. She was previously on Ativan which seemed to help at times. DDX for palpitations would include premature ventricular contractions, premature atrial contractions, premature junctional contractions, electrolyte derangement, hypomagnesemia, hypokalemia, long QT syndrome, torsade de pointes, SVT, A. fib, ACS, etc.    We will do some basic labs, D-dimer to rule out PE other cannot rule out [facility] given her EKG showed a rate of 101 on her previous EKG.   It showed a right axis deviation although this looks to be lead placement as on repeat is all normal.    D-dimer minimally elevated. The CTA was negative for any acute process. Patient did actually seem to have improvement in her PVCs with administration of parenteral magnesium therapy. She they were still present. I discussed with the patient in absence of any cardiac pathology such as an MRI or other worrisome findings PVCs are not considered to be malignant. She is having them every 8-10 beats that were sensing any couplets, triplets or any V. tach. I reviewed the monitor and they did not find any sort of persistent or repetitive PVCs on that although she does have them somewhat frequently. I discussed with her that some people are more prone to them and she reports has not been sleeping well and that may be the cause for her PVCs and discussed there is a somewhat of a cycle because if she is not sleeping well then PVCs be more frequent or the PVCs are keeping her up. She wants to try Ativan has helped her in the past.  I think that is reasonable. I offered her a low-dose beta-blocker i.e. metoprolol, however patient would like to hold off until she sees her PCP. Certainly PVCs are definitely startling and bothersome for a lot of patients however discussed with her that the not considered to be life-threatening. Did advised her if that she will experience worsening symptoms, lightheadedness, passing out or any changes in her condition then she needs to return however at this time will discharge home with outpatient follow-up with cardiology and her primary care doctor. At this time, patient is stable and appropriate for discharge home.  Patient demonstrates understanding of current diagnoses and is in agreement with the treatment plan. Shanice Delgado are advised that while the likelihood of serious underlying condition is low at this point given the evaluation performed today, we cannot fully rule it out. Shanice Delgado are advised to immediately return with any new symptoms or worsening of current condition.  Any Incidental findings were noted on the patient's discharge paperwork as well as verbally directly to the patient, and the appropriate follow-up was given to the patient as far as instructions on testing needed as well as the timeframe.  All questions have been answered. Fern Pedraza is given educational material regarding their diagnoses, including danger symptoms and when to return to the ED. This note was dictated utilizing Dragon voice recognition software. Unfortunately this leads to occasional typographical errors. I apologize in advance if the situation occurs. If questions occur please do not hesitate to contact me directly. Jigar Encarnacion,           ED Course: Progress Notes, Reevaluation, and Consults:       Procedures    Critical Care Time: N/a    Diagnosis     Clinical Impression:   1. Premature ventricular contractions (PVCs) (VPCs)    2. Von Willebrand disease ([address]    3. Insomnia, unspecified type    4. Anxiety    5. Elevated BP without diagnosis of hypertension    6. Fast heart beat    7. Kidney stone        Disposition: Discharge    Follow-up Information     Follow up With Specialties Details Why Contact Info    Lissa Urbina NP Nurse Practitioner Call in 1 day  [address] [address]  [phone] [address] [facility] EMERGENCY DEPT Emergency Medicine  As needed, If symptoms worsen; or [facility] Emergency [address] Eileen Cardona MD Cardiology, Internal Medicine Call in 1 day  [address] [place] [address]  [phone]             Discharge Medication List as of 3/4/2022  6:44 PM      START taking these medications    Details   LORazepam (Ativan) 0.5 mg tablet Take 1 Tablet by mouth every eight (8) hours as needed for Anxiety (Sleep) for up to 4 days.  Max Daily Amount: 1.5 mg., Normal, Disp-12 Tablet, R-0         CONTINUE these medications which have NOT CHANGED    Details sertraline (Zoloft) 25 mg tablet Take 25 mg by mouth daily. , Historical Med         STOP taking these medications       diphenhydrAMINE (BENADRYL) 25 mg capsule Comments:   Reason for Stopping:               Katlin Pappas DO   Emergency Medicine   March 6, 2022, 3:29 PM     This note is dictated utilizing Dragon voice recognition software. Unfortunately this leads to occasional typographical errors using the voice recognition. I apologize in advance if the situation occurs. If questions occur please do not hesitate to contact me directly. Patient was seen  and treated during the context of the COVID-19 pandemic. Contemporary protocols utilized based on the best available evidence, utilizing evolving public health  guidelines and treatment protocols.     Katlin Pappas DO

## 2022-03-04 NOTE — Clinical Note
[address] [facility] EMERGENCY DEPT  [address] [address] [phone]    Work/School Note    Date: 3/4/2022    To Whom It May concern:    Catherine Corrales was seen and treated today in the emergency room by the following provider(s):  Attending Provider: Joselito Kelley is excused from work/school on 03/04/22 and 03/05/22. She is medically clear to return to work/school on 3/6/2022.        Sincerely,          Marcy Reyes

## 2022-03-04 NOTE — ED NOTES
Discharge instructions reviewed with patient. Patient verbalized understanding. Patient advised to follow up as directed on discharge instructions. Patient denies questions, needs or concerns at this time. No s/sx of distress noted.

## 2022-03-04 NOTE — DISCHARGE INSTRUCTIONS
Start taking the magnesium. Follow up with Cardiology. As always, if you feel worse, return to the ED.

## 2022-03-04 NOTE — Clinical Note
[address] [facility] EMERGENCY DEPT  [address] [address] [phone]    Work/School Note    Date: 3/4/2022    To Whom It May concern:    Lyndsay Saldana was seen and treated today in the emergency room by the following provider(s):  Attending Provider: Jayne Rivera is excused from work/school on 03/04/22 and 03/05/22. She is medically clear to return to work/school on 3/6/2022.        Sincerely,          Janie Mota, DO

## 2022-03-05 LAB
ATRIAL RATE: 101 BPM
ATRIAL RATE: 81 BPM
CALCULATED P AXIS, ECG09: 118 DEGREES
CALCULATED P AXIS, ECG09: 53 DEGREES
CALCULATED R AXIS, ECG10: 169 DEGREES
CALCULATED R AXIS, ECG10: 21 DEGREES
CALCULATED T AXIS, ECG11: -177 DEGREES
CALCULATED T AXIS, ECG11: 19 DEGREES
DIAGNOSIS, 93000: NORMAL
DIAGNOSIS, 93000: NORMAL
P-R INTERVAL, ECG05: 142 MS
P-R INTERVAL, ECG05: 150 MS
Q-T INTERVAL, ECG07: 332 MS
Q-T INTERVAL, ECG07: 344 MS
QRS DURATION, ECG06: 88 MS
QRS DURATION, ECG06: 90 MS
QTC CALCULATION (BEZET), ECG08: 399 MS
QTC CALCULATION (BEZET), ECG08: 430 MS
VENTRICULAR RATE, ECG03: 101 BPM
VENTRICULAR RATE, ECG03: 81 BPM

## 2022-03-09 ENCOUNTER — CLINICAL SUPPORT (OUTPATIENT)
Dept: FAMILY MEDICINE CLINIC | Age: 22
End: 2022-03-09
Payer: COMMERCIAL

## 2022-03-09 DIAGNOSIS — R35.0 URINE FREQUENCY: Primary | ICD-10-CM

## 2022-03-09 LAB
BILIRUB UR QL STRIP: NEGATIVE
GLUCOSE UR-MCNC: NEGATIVE MG/DL
KETONES P FAST UR STRIP-MCNC: NEGATIVE MG/DL
PH UR STRIP: 6 [PH] (ref 4.6–8)
PROT UR QL STRIP: NEGATIVE
SP GR UR STRIP: 1.02 (ref 1–1.03)
UA UROBILINOGEN AMB POC: NORMAL (ref 0.2–1)
URINALYSIS CLARITY POC: NORMAL
URINALYSIS COLOR POC: NORMAL
URINE BLOOD POC: NEGATIVE
URINE LEUKOCYTES POC: NEGATIVE
URINE NITRITES POC: NEGATIVE

## 2022-03-09 PROCEDURE — 81003 URINALYSIS AUTO W/O SCOPE: CPT | Performed by: NURSE PRACTITIONER

## 2022-03-18 PROBLEM — N20.0 KIDNEY STONE: Status: ACTIVE | Noted: 2017-02-22

## 2022-03-19 PROBLEM — R00.0 FAST HEART BEAT: Status: ACTIVE | Noted: 2019-09-24

## 2022-03-19 PROBLEM — F41.9 ANXIETY: Status: ACTIVE | Noted: 2022-01-07

## 2022-03-20 PROBLEM — R03.0 ELEVATED BP WITHOUT DIAGNOSIS OF HYPERTENSION: Status: ACTIVE | Noted: 2022-01-07

## 2022-03-28 ENCOUNTER — OFFICE VISIT (OUTPATIENT)
Dept: CARDIOLOGY CLINIC | Age: 22
End: 2022-03-28
Payer: COMMERCIAL

## 2022-03-28 VITALS
BODY MASS INDEX: 29.73 KG/M2 | HEART RATE: 103 BPM | HEIGHT: 66 IN | DIASTOLIC BLOOD PRESSURE: 72 MMHG | WEIGHT: 185 LBS | SYSTOLIC BLOOD PRESSURE: 124 MMHG | OXYGEN SATURATION: 98 %

## 2022-03-28 DIAGNOSIS — R00.2 PALPITATIONS: Primary | ICD-10-CM

## 2022-03-28 DIAGNOSIS — Z71.6 TOBACCO ABUSE COUNSELING: ICD-10-CM

## 2022-03-28 PROBLEM — R10.2 PELVIC AND PERINEAL PAIN: Status: ACTIVE | Noted: 2020-02-21

## 2022-03-28 PROBLEM — H81.11 BENIGN PAROXYSMAL VERTIGO, RIGHT EAR: Status: ACTIVE | Noted: 2021-03-13

## 2022-03-28 PROCEDURE — 99204 OFFICE O/P NEW MOD 45 MIN: CPT | Performed by: INTERNAL MEDICINE

## 2022-03-28 NOTE — PROGRESS NOTES
[facility]    Ms. Perla Garner is a 24-year-old female with ADHD and other medical problem    Patient is here today for cardiac evaluation. She denies any prior history of MI or CHF. Her last 2 years patient has been having on and off symptoms of palpitation. Initially approximately 3 years ago she underwent echocardiogram and event monitor for 7 days and she was told that she may have a PVCs. She was started Inderal however no significant improvement in symptoms were noted so she stopped taking the medication. For last few weeks patient has been experiencing some palpitation and \"skipped beat\" sensation which makes her short of breath and occasional dizzy. No presyncope or syncope. Denies any excessive racing of the heart rate. Denies any chest pain or chest tightness concerning for angina. Denies any lower extremity swelling. Since likely symptoms are slowly getting worse  Denies any nausea, vomiting, abdominal pain, fever, chills, sputum production. No hematuria or other bleeding complaints    Past Medical History:   Diagnosis Date    ADHD (attention deficit hyperactivity disorder)     Adverse effect of anesthesia 2015    dermoid cyst on right ovary (woke up had some hives after general anesthesia)    Anemia     Bicornate uterus     GERD (gastroesophageal reflux disease)     Kidney stones     Pelvic mass     Unspecified adverse effect of anesthesia     hives with general anethesia    Von Willebrand disease ([facility] [address]     she does not have per mother       Review of Systems:  Cardiac symptoms as noted above in HPI. All others negative. Denies fatigue, malaise, skin rash, joint pain, blurring vision, photophobia, neck pain, hemoptysis, chronic cough, nausea, vomiting, hematuria, burning micturition, BRBPR, chronic headaches.     Current Outpatient Medications   Medication Sig    sertraline (Zoloft) 25 mg tablet Take 25 mg by mouth daily. No current facility-administered medications for this visit. Past Surgical History:   Procedure Laterality Date    HX COLONOSCOPY      HX LITHOTRIPSY  2015    HX OTHER SURGICAL      BIRTH SHIRA REMOVED FROM BUTTOCKS @ 6WEEKS OLD SX.    HX OTHER SURGICAL  2015    dermoid cyst removed       Allergies and Sensitivities:  Allergies   Allergen Reactions    Penicillin Rash    Amoxicillin Hives    Morphine Other (comments)     Denies allergy      Nsaids (Non-Steroidal Anti-Inflammatory Drug) Other (comments)       Family History:  Family History   Problem Relation Age of Onset    Heart Disease Maternal Grandfather     Heart Disease Paternal Grandmother     Diabetes Paternal Grandfather     No Known Problems Mother     No Known Problems Father     Heart Attack Paternal Uncle        Social History:  Social History     Tobacco Use    Smoking status: Current Every Day Smoker     Packs/day: 0.50     Types: Cigarettes    Smokeless tobacco: Current User    Tobacco comment: vape   Vaping Use    Vaping Use: Never used   Substance Use Topics    Alcohol use: Yes     Alcohol/week: 2.0 standard drinks     Types: 2 Glasses of wine per week    Drug use: Yes     Types: Marijuana     She  reports that she has been smoking cigarettes. She has been smoking about 0.50 packs per day. She uses smokeless tobacco.  She  reports current alcohol use of about 2.0 standard drinks of alcohol per week. Physical Exam:  BP Readings from Last 3 Encounters:   03/28/22 124/72   03/04/22 113/79   01/25/22 132/84         Pulse Readings from Last 3 Encounters:   03/28/22 (!) 103   03/04/22 90   01/25/22 88          Wt Readings from Last 3 Encounters:   03/28/22 83.9 kg (185 lb)   03/04/22 78 kg (172 lb)   01/25/22 86.2 kg (190 lb)       Constitutional: Oriented to person, place, and time. HENT: Head: Normocephalic and atraumatic. Neck: No JVD present. Carotid bruit is not appreciated.    Cardiovascular: Regular rhythm. No murmur, gallop or rubs appreciated  Lung: Breath sounds normal. No respiratory distress. No ronchi or rales appreciated  Abdominal: No tenderness. No rebound and no guarding. Musculoskeletal: There is no lower extremity edema. No cynosis  Lymphadenopathy:  No cervical or supraclavicular adenopathy appriciated. Neurological: No gross motor deficit noted. Skin: No visible skin rash noted. No Ear discharge noted      LABS:   @  Lab Results   Component Value Date/Time    WBC 11.8 2022 03:10 PM    HGB 14.2 2022 03:10 PM    HCT 42.1 2022 03:10 PM    PLATELET 506  03:10 PM    MCV 87.7 2022 03:10 PM     Lab Results   Component Value Date/Time    Sodium 138 2022 03:10 PM    Potassium 3.7 2022 03:10 PM    Chloride 109 2022 03:10 PM    CO2 23 2022 03:10 PM    Glucose 82 2022 03:10 PM    BUN 10 2022 03:10 PM    Creatinine 0.74 2022 03:10 PM     No flowsheet data found. Lab Results   Component Value Date/Time    ALT (SGPT) 27 2022 03:10 PM     No results found for: HBA1C, UZF0CSGZ, NPU0QBDG  Lab Results   Component Value Date/Time    TSH 1.56 2019 02:31 PM     EK2022: Sinus rhythm at 81 bpm.  Nonspecific T wave changes. Normal OH and QRS interval    STRESS TEST (EST, PHARM, NUC, ECHO etc)    CATHETERIZATION    IMPRESSION & PLAN:  Ms. Judy Frausto is 22-year-old female    Palpitation:  Based on symptoms, she may be having frequent PVC  We will check event monitor for 30-day to rule out any PVC burden  Echo to rule out abnormal cardiac structure  We will check TSH  Currently without any presyncope or syncope    Tobacco abuse disorder:  Patient is currently smoking 5-6 cigarettes a day. She used to smoke up to 1 pack of cigarettes a day.   She has been smoking for last 5 yearss  Patient educated for consequences/sequela and risk of smoking including but not limited to CAD/PAD/stroke/COPD/lung cancer/other cancer and death. Patient understood completely and expressed and verbalized understanding. Today I had a lengthy discussion with the patient for more than 5 minutes discussing about importance of smoking cessation. Counseling was offered. Patient is working towards that goal.  Medications options were discussed and patient will think about it      This plan was discussed with patient who is in agreement. Thank you for allowing me to participate in patient care. Please feel free to call me if you have any question or concern. Sergio Mcdonnell MD  Please note: This document has been produced using voice recognition software. Unrecognized errors in transcription may be present.

## 2022-03-28 NOTE — LETTER
3/28/2022    Patient: Ang Chambers   YOB: 2000   Date of Visit: 3/28/2022     Fayette Blizzard, NP  [address] [place] [address] [address]    Dear Fayette Blizzard, NP,      Thank you for referring Ms. Ang Chambers to [address] [place] [address] for evaluation. My notes for this consultation are attached. If you have questions, please do not hesitate to call me. I look forward to following your patient along with you.       Sincerely,    Tova Cherry MD

## 2022-04-02 LAB — SARS-COV-2, NAA: NOT DETECTED

## 2022-04-14 ENCOUNTER — OFFICE VISIT (OUTPATIENT)
Dept: FAMILY MEDICINE CLINIC | Age: 22
End: 2022-04-14
Payer: COMMERCIAL

## 2022-04-14 VITALS
BODY MASS INDEX: 29.41 KG/M2 | OXYGEN SATURATION: 96 % | HEART RATE: 92 BPM | RESPIRATION RATE: 16 BRPM | SYSTOLIC BLOOD PRESSURE: 120 MMHG | DIASTOLIC BLOOD PRESSURE: 88 MMHG | HEIGHT: 66 IN | WEIGHT: 183 LBS | TEMPERATURE: 98 F

## 2022-04-14 DIAGNOSIS — R00.2 PALPITATIONS: ICD-10-CM

## 2022-04-14 DIAGNOSIS — F41.9 ANXIETY: Primary | ICD-10-CM

## 2022-04-14 DIAGNOSIS — F41.9 ANXIETY: ICD-10-CM

## 2022-04-14 PROCEDURE — 99213 OFFICE O/P EST LOW 20 MIN: CPT | Performed by: NURSE PRACTITIONER

## 2022-04-14 NOTE — PROGRESS NOTES
Lisbeth Damon is a 19 y.o. female presenting today for Anxiety (3 month follow-up) and Referral Follow Up  . Chief Complaint   Patient presents with    Anxiety     3 month follow-up    Referral Follow Up       HPI:  Lisbeth Damon presents to the office today for anxiety follow-up care. She had previously stopped her Zoloft prescription for approximately 2 to 3 months. Patient was complaining of anxiety and was instructed to restart her medication. She notes that she feels much better and feels like her symptoms are controlled with        Review of Systems   Constitutional: Negative for malaise/fatigue. Respiratory: Negative for cough and shortness of breath. Cardiovascular: Negative for chest pain, palpitations and leg swelling. Gastrointestinal: Negative for abdominal pain, nausea and vomiting. Genitourinary: Negative for dysuria. Musculoskeletal: Negative for back pain and myalgias. Neurological: Negative for dizziness and headaches. Psychiatric/Behavioral: The patient is nervous/anxious.         Allergies   Allergen Reactions    Penicillin Rash    Amoxicillin Hives    Morphine Other (comments)     Denies allergy      Nsaids (Non-Steroidal Anti-Inflammatory Drug) Other (comments)       PHQ Screening   3 most recent PHQ Screens 4/14/2022   Little interest or pleasure in doing things Not at all   Feeling down, depressed, irritable, or hopeless Not at all   Total Score PHQ 2 0   Trouble falling or staying asleep, or sleeping too much -   Feeling tired or having little energy -   Poor appetite, weight loss, or overeating -   Feeling bad about yourself - or that you are a failure or have let yourself or your family down -   Trouble concentrating on things such as school, work, reading, or watching TV -   Moving or speaking so slowly that other people could have noticed; or the opposite being so fidgety that others notice -   Thoughts of being better off dead, or hurting yourself in some way - PHQ 9 Score -   How difficult have these problems made it for you to do your work, take care of your home and get along with others -       History  Past Medical History:   Diagnosis Date    ADHD (attention deficit hyperactivity disorder)     Adverse effect of anesthesia 2015    dermoid cyst on right ovary (woke up had some hives after general anesthesia)    Anemia     Bicornate uterus     GERD (gastroesophageal reflux disease)     Kidney stones     Pelvic mass     Unspecified adverse effect of anesthesia     hives with general anethesia    Von Willebrand disease ([address]     she does not have per mother       Past Surgical History:   Procedure Laterality Date    HX COLONOSCOPY      HX LITHOTRIPSY  2015    HX OTHER SURGICAL      BIRTH SHIRA REMOVED FROM BUTTOCKS @ 6WEEKS OLD SX.    HX OTHER SURGICAL  2015    dermoid cyst removed       Social History     Socioeconomic History    Marital status: SINGLE     Spouse name: Not on file    Number of children: Not on file    Years of education: Not on file    Highest education level: Not on file   Occupational History    Not on file   Tobacco Use    Smoking status: Current Every Day Smoker     Packs/day: 0.50     Types: Cigarettes    Smokeless tobacco: Current User    Tobacco comment: vape   Vaping Use    Vaping Use: Never used   Substance and Sexual Activity    Alcohol use:  Yes     Alcohol/week: 2.0 standard drinks     Types: 2 Glasses of wine per week    Drug use: Yes     Types: Marijuana    Sexual activity: Yes     Partners: Male     Birth control/protection: Condom   Other Topics Concern    Not on file   Social History Narrative    Not on file     Social Determinants of Health     Financial Resource Strain:     Difficulty of Paying Living Expenses: Not on file   Food Insecurity:     Worried About Running Out of Food in the Last Year: Not on file    Grant of Food in the Last Year: Not on file   Transportation Needs:     Lack of Transportation (Medical): Not on file    Lack of Transportation (Non-Medical): Not on file   Physical Activity:     Days of Exercise per Week: Not on file    Minutes of Exercise per Session: Not on file   Stress:     Feeling of Stress : Not on file   Social Connections:     Frequency of Communication with Friends and Family: Not on file    Frequency of Social Gatherings with Friends and Family: Not on file    Attends [employer] Services: Not on file    Active Member of [address] [place] [address] or Organizations: Not on file    Attends Club or Organization Meetings: Not on file    Marital Status: Not on file   Intimate Partner Violence:     Fear of Current or Ex-Partner: Not on file    Emotionally Abused: Not on file    Physically Abused: Not on file    Sexually Abused: Not on file   Housing Stability:     Unable to Pay for Housing in the Last Year: Not on file    Number of [place] in the Last Year: Not on file    Unstable Housing in the Last Year: Not on file       Current Outpatient Medications   Medication Sig Dispense Refill    sertraline (Zoloft) 25 mg tablet Take 25 mg by mouth daily. Vitals:    04/14/22 0949   BP: 120/88   Pulse: 92   Resp: 16   Temp: 98 °F (36.7 °C)   TempSrc: Oral   SpO2: 96%   Weight: 183 lb (83 kg)   Height: 5' 6\" (1.676 m)   PainSc:   0 - No pain       Physical Exam  Vitals and nursing note reviewed. Constitutional:       Appearance: Normal appearance. HENT:      Head: Normocephalic. Cardiovascular:      Rate and Rhythm: Normal rate and regular rhythm. Pulses: Normal pulses. Heart sounds: Normal heart sounds. Pulmonary:      Effort: Pulmonary effort is normal.      Breath sounds: Normal breath sounds. Abdominal:      General: Bowel sounds are normal.      Palpations: Abdomen is soft. Skin:     General: Skin is warm and dry. Neurological:      General: No focal deficit present. Mental Status: She is alert.          Clinical Support on 03/09/2022 Component Date Value Ref Range Status    Color (UA POC) 03/09/2022 Light Yellow   Final    Clarity (UA POC) 03/09/2022 Slightly Cloudy   Final    Glucose (UA POC) 03/09/2022 Negative  Negative Final    Bilirubin (UA POC) 03/09/2022 Negative  Negative Final    Ketones (UA POC) 03/09/2022 Negative  Negative Final    Specific gravity (UA POC) 03/09/2022 1.020  1.001 - 1.035 Final    Blood (UA POC) 03/09/2022 Negative  Negative Final    pH (UA POC) 03/09/2022 6.0  4.6 - 8.0 Final    Protein (UA POC) 03/09/2022 Negative  Negative Final    Urobilinogen (UA POC) 03/09/2022 0.2 mg/dL  0.2 - 1 Final    Nitrites (UA POC) 03/09/2022 Negative  Negative Final    Leukocyte esterase (UA POC) 03/09/2022 Negative  Negative Final   Admission on 03/04/2022, Discharged on 03/04/2022   Component Date Value Ref Range Status    Ventricular Rate 03/04/2022 101  BPM Final    Atrial Rate 03/04/2022 101  BPM Final    P-R Interval 03/04/2022 142  ms Final    QRS Duration 03/04/2022 88  ms Final    Q-T Interval 03/04/2022 332  ms Final    QTC Calculation (Bezet) 03/04/2022 430  ms Final    Calculated P Axis 03/04/2022 118  degrees Final    Calculated R Axis 03/04/2022 169  degrees Final    Calculated T Axis 03/04/2022 -177  degrees Final    Diagnosis 03/04/2022    Final                    Value:Suspect arm lead reversal, interpretation assumes no reversal  Sinus tachycardia with occasional premature ventricular complexes  Right axis deviation  T wave abnormality, consider inferior ischemia  Abnormal ECG  Confirmed by Federico Yang MD, [facility] ([id number]) on 3/5/2022 11:51:02 AM      WBC 03/04/2022 11.8  4.6 - 13.2 K/uL Final    RBC 03/04/2022 4.80  4.20 - 5.30 M/uL Final    HGB 03/04/2022 14.2  12.0 - 16.0 g/dL Final    HCT 03/04/2022 42.1  35.0 - 45.0 % Final    MCV 03/04/2022 87.7  78.0 - 100.0 FL Final    MCH 03/04/2022 29.6  24.0 - 34.0 PG Final    MCHC 03/04/2022 33.7  31.0 - 37.0 g/dL Final    RDW 03/04/2022 11.7  11.6 - 14.5 % Final    PLATELET 11/72/7220 544  135 - 420 K/uL Final    MPV 03/04/2022 10.9  9.2 - 11.8 FL Final    NRBC 03/04/2022 0.0  0  WBC Final    ABSOLUTE NRBC 03/04/2022 0.00  0.00 - 0.01 K/uL Final    NEUTROPHILS 03/04/2022 66  40 - 73 % Final    LYMPHOCYTES 03/04/2022 26  21 - 52 % Final    MONOCYTES 03/04/2022 8  3 - 10 % Final    EOSINOPHILS 03/04/2022 1  0 - 5 % Final    BASOPHILS 03/04/2022 0  0 - 2 % Final    IMMATURE GRANULOCYTES 03/04/2022 0  0.0 - 0.5 % Final    ABS. NEUTROPHILS 03/04/2022 7.7  1.8 - 8.0 K/UL Final    ABS. LYMPHOCYTES 03/04/2022 3.0  0.9 - 3.6 K/UL Final    ABS. MONOCYTES 03/04/2022 0.9  0.05 - 1.2 K/UL Final    ABS. EOSINOPHILS 03/04/2022 0.1  0.0 - 0.4 K/UL Final    ABS. BASOPHILS 03/04/2022 0.0  0.0 - 0.1 K/UL Final    ABS. IMM. GRANS. 03/04/2022 0.0  0.00 - 0.04 K/UL Final    DF 03/04/2022 AUTOMATED    Final    Sodium 03/04/2022 138  136 - 145 mmol/L Final    Potassium 03/04/2022 3.7  3.5 - 5.5 mmol/L Final    Chloride 03/04/2022 109  100 - 111 mmol/L Final    CO2 03/04/2022 23  21 - 32 mmol/L Final    Anion gap 03/04/2022 6  3.0 - 18 mmol/L Final    Glucose 03/04/2022 82  74 - 99 mg/dL Final    BUN 03/04/2022 10  7.0 - 18 MG/DL Final    Creatinine 03/04/2022 0.74  0.6 - 1.3 MG/DL Final    BUN/Creatinine ratio 03/04/2022 14  12 - 20   Final    GFR est AA 03/04/2022 >60  >60 ml/min/1.73m2 Final    GFR est non-AA 03/04/2022 >60  >60 ml/min/1.73m2 Final    Comment: (NOTE)  Estimated GFR is calculated using the Modification of Diet in Renal   Disease (MDRD) Study equation, reported for both  Americans   (GFRAA) and non- Americans (GFRNA), and normalized to 1.73m2   body surface area. The physician must decide which value applies to   the patient. The MDRD study equation should only be used in   individuals age 25 or older.  It has not been validated for the   following: pregnant women, patients with serious comorbid conditions,   or on certain medications, or persons with extremes of body size,   muscle mass, or nutritional status.  Calcium 03/04/2022 8.6  8.5 - 10.1 MG/DL Final    Bilirubin, total 03/04/2022 0.4  0.2 - 1.0 MG/DL Final    ALT (SGPT) 03/04/2022 27  13 - 56 U/L Final    AST (SGOT) 03/04/2022 24  10 - 38 U/L Final    Alk. phosphatase 03/04/2022 100  45 - 117 U/L Final    Protein, total 03/04/2022 7.7  6.4 - 8.2 g/dL Final    Albumin 03/04/2022 3.6  3.4 - 5.0 g/dL Final    Globulin 03/04/2022 4.1* 2.0 - 4.0 g/dL Final    A-G Ratio 03/04/2022 0.9  0.8 - 1.7   Final    NT pro-BNP 03/04/2022 63  0 - 450 PG/ML Final    Comment:           For patients with dyspnea, NT proBNP is highly sensitive for detecting acute congestive heart failure. Also, an NT proBNP <300 pg/mL effectively rules out acute congestive heart failure, with 99% negative predictive value. Our reference ranges are for acute dyspnea. Age              Range (pg/ml)        0-49                0-450        50-75               0-900        >75                 0-1800       For ambulatory office patients, the ranges below apply: For patients with dyspnea, NT proBNP is highly sensitive for detecting acute congestive heart failure. Also, an NT proBNP <300 pg/mL effectively rules out acute congestive heart failure, with 99% negative predictive value. Our reference ranges are for acute dyspnea.  Troponin-High Sensitivity 03/04/2022 5  0 - 54 ng/L Final    Up to 50% of normal patients may have low levels of detectable troponin (within reference range) circulating in the blood. Mild elevations in troponin that are above the reference range, may be present with other cardiac and non-cardiac disease states. Two or three serial tests at two hour intervals, are recommended to evaluate changes in circulating troponin over time.     Magnesium 03/04/2022 1.7  1.6 - 2.6 mg/dL Final    Ventricular Rate 03/04/2022 81  BPM Final    Atrial Rate 03/04/2022 81  BPM Final  P-R Interval 03/04/2022 150  ms Final    QRS Duration 03/04/2022 90  ms Final    Q-T Interval 03/04/2022 344  ms Final    QTC Calculation (Bezet) 03/04/2022 399  ms Final    Calculated P Axis 03/04/2022 53  degrees Final    Calculated R Axis 03/04/2022 21  degrees Final    Calculated T Axis 03/04/2022 19  degrees Final    Diagnosis 03/04/2022    Final                    Value:Normal sinus rhythm  Normal ECG  When compared with ECG of 04-MAR-2022 14:25,  premature ventricular complexes are no longer present  QRS axis shifted left  Confirmed by Gabriel Soria MD, Lul Borrego ([id number]) on 3/5/2022 11:51:39 AM      D DIMER 03/04/2022 0.85* <0.46 ug/ml(FEU) Final    Comment: (NOTE)  A D-Dimer result less than 0.5 ug/mL FEU combined with a low clinical   pretest probability of DVT and/or PE has a negative predictive value   of %. The positive predictive value is 50% or less.  HCG, Ql. 03/04/2022 Negative  NEG   Final    Test results should be confirmed using serum quantitative hCG when detection of pregnancy is critical and before performing any critical medical procedure. No results found for any visits on 04/14/22. Patient Care Team:  Patient Care Team:  Edda Simmonds, NP as PCP - General (Nurse Practitioner)  Edda Simmonds, NP as PCP - [facility] Provider      Assessment / Plan:      ICD-10-CM ICD-9-CM    1. Anxiety  F41.9 300.00 LIPID PANEL      METABOLIC PANEL, COMPREHENSIVE      CBC WITH AUTOMATED DIFF      TSH 3RD GENERATION   2. Palpitations  R00.2 785.1 LIPID PANEL      METABOLIC PANEL, COMPREHENSIVE      CBC WITH AUTOMATED DIFF      TSH 3RD GENERATION     Fasting labs ordered  Palpitations noted during anxiety      I asked the patient if she  had any questions and answered her  questions.   The patient stated that she understands the treatment plan and agrees with the treatment plan    This document was created with a voice activated dictation system and may contain transcription errors.

## 2022-04-14 NOTE — PROGRESS NOTES
Charla Johnston presents today for   Chief Complaint   Patient presents with    Anxiety     3 month follow-up    Referral Follow Up       Is someone accompanying this pt? no    Is the patient using any DME equipment during OV? no    Depression Screening:  3 most recent PHQ Screens 4/14/2022   Little interest or pleasure in doing things Not at all   Feeling down, depressed, irritable, or hopeless Not at all   Total Score PHQ 2 0   Trouble falling or staying asleep, or sleeping too much -   Feeling tired or having little energy -   Poor appetite, weight loss, or overeating -   Feeling bad about yourself - or that you are a failure or have let yourself or your family down -   Trouble concentrating on things such as school, work, reading, or watching TV -   Moving or speaking so slowly that other people could have noticed; or the opposite being so fidgety that others notice -   Thoughts of being better off dead, or hurting yourself in some way -   PHQ 9 Score -   How difficult have these problems made it for you to do your work, take care of your home and get along with others -       Learning Assessment:  Learning Assessment 6/6/2019   PRIMARY LEARNER Patient   HIGHEST LEVEL OF EDUCATION - PRIMARY LEARNER  SOME COLLEGE   BARRIERS PRIMARY LEARNER NONE   PRIMARY LANGUAGE ENGLISH   LEARNER PREFERENCE PRIMARY DEMONSTRATION   ANSWERED BY self   RELATIONSHIP SELF       Health Maintenance reviewed and discussed and ordered per Provider. Health Maintenance Due   Topic Date Due    Hepatitis C Screening  Never done    Pneumococcal 0-64 years (1 - PCV) Never done    HPV Age 9Y-34Y (1 - 2-dose series) Never done    DTaP/Tdap/Td series (1 - Tdap) Never done    Pap Smear  Never done    COVID-19 Vaccine (3 - Booster for Moderna series) 10/07/2021   . Coordination of Care:  1. Have you been to the ER, urgent care clinic since your last visit? Hospitalized since your last visit? no    2.  Have you seen or consulted any other health care providers outside of the [address] [place] [address] since your last visit? Include any pap smears or colon screening.  no

## 2022-05-10 ENCOUNTER — TELEPHONE (OUTPATIENT)
Dept: CARDIOLOGY CLINIC | Age: 22
End: 2022-05-10

## 2022-05-13 ENCOUNTER — TELEPHONE (OUTPATIENT)
Dept: CARDIOLOGY CLINIC | Age: 22
End: 2022-05-13

## 2022-05-13 NOTE — TELEPHONE ENCOUNTER
----- Message from Barrington Garcia MD sent at 5/11/2022  4:10 PM EDT -----  Cardiac testing appears normal.  Inform patient please    Thanks  SP

## 2022-05-25 DIAGNOSIS — R00.2 PALPITATIONS: ICD-10-CM

## 2022-06-29 DIAGNOSIS — F32.A ANXIETY AND DEPRESSION: Primary | ICD-10-CM

## 2022-06-29 DIAGNOSIS — F41.9 ANXIETY AND DEPRESSION: Primary | ICD-10-CM

## 2022-06-29 RX ORDER — SERTRALINE HYDROCHLORIDE 25 MG/1
25 TABLET, FILM COATED ORAL DAILY
Qty: 30 TABLET | Refills: 2 | Status: SHIPPED | OUTPATIENT
Start: 2022-06-29

## 2022-06-29 NOTE — TELEPHONE ENCOUNTER
Requested Prescriptions     Pending Prescriptions Disp Refills    sertraline (Zoloft) 25 mg tablet       Sig: Take 1 Tablet by mouth daily.

## 2022-07-31 ENCOUNTER — APPOINTMENT (OUTPATIENT)
Dept: CT IMAGING | Age: 22
End: 2022-07-31
Attending: STUDENT IN AN ORGANIZED HEALTH CARE EDUCATION/TRAINING PROGRAM
Payer: COMMERCIAL

## 2022-07-31 ENCOUNTER — APPOINTMENT (OUTPATIENT)
Dept: ULTRASOUND IMAGING | Age: 22
End: 2022-07-31
Attending: STUDENT IN AN ORGANIZED HEALTH CARE EDUCATION/TRAINING PROGRAM
Payer: COMMERCIAL

## 2022-07-31 ENCOUNTER — HOSPITAL ENCOUNTER (EMERGENCY)
Age: 22
Discharge: HOME | End: 2022-07-31
Attending: STUDENT IN AN ORGANIZED HEALTH CARE EDUCATION/TRAINING PROGRAM
Payer: COMMERCIAL

## 2022-07-31 VITALS
WEIGHT: 195 LBS | TEMPERATURE: 98.7 F | SYSTOLIC BLOOD PRESSURE: 121 MMHG | BODY MASS INDEX: 31.34 KG/M2 | HEART RATE: 91 BPM | DIASTOLIC BLOOD PRESSURE: 69 MMHG | HEIGHT: 66 IN | RESPIRATION RATE: 18 BRPM | OXYGEN SATURATION: 98 %

## 2022-07-31 DIAGNOSIS — D72.829 LEUKOCYTOSIS, UNSPECIFIED TYPE: ICD-10-CM

## 2022-07-31 DIAGNOSIS — R19.7 DIARRHEA, UNSPECIFIED TYPE: Primary | ICD-10-CM

## 2022-07-31 LAB
ALBUMIN SERPL-MCNC: 3.8 G/DL (ref 3.4–5)
ALBUMIN/GLOB SERPL: 0.9 {RATIO} (ref 0.8–1.7)
ALP SERPL-CCNC: 120 U/L (ref 45–117)
ALT SERPL-CCNC: 29 U/L (ref 13–56)
ANION GAP SERPL CALC-SCNC: 7 MMOL/L (ref 3–18)
APPEARANCE UR: CLEAR
AST SERPL-CCNC: 25 U/L (ref 10–38)
BASOPHILS # BLD: 0 K/UL (ref 0–0.1)
BASOPHILS NFR BLD: 0 % (ref 0–2)
BILIRUB SERPL-MCNC: 0.4 MG/DL (ref 0.2–1)
BILIRUB UR QL: NEGATIVE
BUN SERPL-MCNC: 8 MG/DL (ref 7–18)
BUN/CREAT SERPL: 13 (ref 12–20)
CALCIUM SERPL-MCNC: 9.4 MG/DL (ref 8.5–10.1)
CHLORIDE SERPL-SCNC: 107 MMOL/L (ref 100–111)
CO2 SERPL-SCNC: 25 MMOL/L (ref 21–32)
COLOR UR: YELLOW
CREAT SERPL-MCNC: 0.64 MG/DL (ref 0.6–1.3)
DIFFERENTIAL METHOD BLD: ABNORMAL
EOSINOPHIL # BLD: 0.1 K/UL (ref 0–0.4)
EOSINOPHIL NFR BLD: 1 % (ref 0–5)
ERYTHROCYTE [DISTWIDTH] IN BLOOD BY AUTOMATED COUNT: 12.1 % (ref 11.6–14.5)
GLOBULIN SER CALC-MCNC: 4.3 G/DL (ref 2–4)
GLUCOSE SERPL-MCNC: 115 MG/DL (ref 74–99)
GLUCOSE UR STRIP.AUTO-MCNC: NEGATIVE MG/DL
HCG UR QL: NEGATIVE
HCT VFR BLD AUTO: 41.6 % (ref 35–45)
HGB BLD-MCNC: 14.3 G/DL (ref 12–16)
HGB UR QL STRIP: NEGATIVE
IMM GRANULOCYTES # BLD AUTO: 0.1 K/UL (ref 0–0.04)
IMM GRANULOCYTES NFR BLD AUTO: 0 % (ref 0–0.5)
KETONES UR QL STRIP.AUTO: NEGATIVE MG/DL
LEUKOCYTE ESTERASE UR QL STRIP.AUTO: NEGATIVE
LYMPHOCYTES # BLD: 2.9 K/UL (ref 0.9–3.6)
LYMPHOCYTES NFR BLD: 16 % (ref 21–52)
MCH RBC QN AUTO: 30.3 PG (ref 24–34)
MCHC RBC AUTO-ENTMCNC: 34.4 G/DL (ref 31–37)
MCV RBC AUTO: 88.1 FL (ref 78–100)
MONOCYTES # BLD: 1.2 K/UL (ref 0.05–1.2)
MONOCYTES NFR BLD: 7 % (ref 3–10)
NEUTS SEG # BLD: 14 K/UL (ref 1.8–8)
NEUTS SEG NFR BLD: 76 % (ref 40–73)
NITRITE UR QL STRIP.AUTO: NEGATIVE
NRBC # BLD: 0 K/UL (ref 0–0.01)
NRBC BLD-RTO: 0 PER 100 WBC
PH UR STRIP: 5 [PH] (ref 5–8)
PLATELET # BLD AUTO: 305 K/UL (ref 135–420)
PMV BLD AUTO: 10.4 FL (ref 9.2–11.8)
POTASSIUM SERPL-SCNC: 3.8 MMOL/L (ref 3.5–5.5)
PROT SERPL-MCNC: 8.1 G/DL (ref 6.4–8.2)
PROT UR STRIP-MCNC: NEGATIVE MG/DL
RBC # BLD AUTO: 4.72 M/UL (ref 4.2–5.3)
SODIUM SERPL-SCNC: 139 MMOL/L (ref 136–145)
SP GR UR REFRACTOMETRY: 1.02 (ref 1–1.03)
UROBILINOGEN UR QL STRIP.AUTO: 1 EU/DL (ref 0.2–1)
WBC # BLD AUTO: 18.3 K/UL (ref 4.6–13.2)

## 2022-07-31 PROCEDURE — 81003 URINALYSIS AUTO W/O SCOPE: CPT

## 2022-07-31 PROCEDURE — 74011000636 HC RX REV CODE- 636: Performed by: STUDENT IN AN ORGANIZED HEALTH CARE EDUCATION/TRAINING PROGRAM

## 2022-07-31 PROCEDURE — 76830 TRANSVAGINAL US NON-OB: CPT

## 2022-07-31 PROCEDURE — 74011250636 HC RX REV CODE- 250/636: Performed by: STUDENT IN AN ORGANIZED HEALTH CARE EDUCATION/TRAINING PROGRAM

## 2022-07-31 PROCEDURE — 96374 THER/PROPH/DIAG INJ IV PUSH: CPT

## 2022-07-31 PROCEDURE — 74177 CT ABD & PELVIS W/CONTRAST: CPT

## 2022-07-31 PROCEDURE — 80053 COMPREHEN METABOLIC PANEL: CPT

## 2022-07-31 PROCEDURE — 85025 COMPLETE CBC W/AUTO DIFF WBC: CPT

## 2022-07-31 PROCEDURE — 81025 URINE PREGNANCY TEST: CPT

## 2022-07-31 PROCEDURE — 99285 EMERGENCY DEPT VISIT HI MDM: CPT

## 2022-07-31 PROCEDURE — 96375 TX/PRO/DX INJ NEW DRUG ADDON: CPT

## 2022-07-31 RX ORDER — ACETAMINOPHEN 325 MG/1
650 TABLET ORAL
Qty: 20 TABLET | Refills: 0 | Status: SHIPPED | OUTPATIENT
Start: 2022-07-31

## 2022-07-31 RX ORDER — ONDANSETRON 2 MG/ML
4 INJECTION INTRAMUSCULAR; INTRAVENOUS ONCE
Status: COMPLETED | OUTPATIENT
Start: 2022-07-31 | End: 2022-07-31

## 2022-07-31 RX ORDER — DIPHENOXYLATE HYDROCHLORIDE AND ATROPINE SULFATE 2.5; .025 MG/1; MG/1
1 TABLET ORAL
Qty: 5 TABLET | Refills: 0 | Status: SHIPPED | OUTPATIENT
Start: 2022-07-31

## 2022-07-31 RX ORDER — KETOROLAC TROMETHAMINE 15 MG/ML
15 INJECTION, SOLUTION INTRAMUSCULAR; INTRAVENOUS ONCE
Status: COMPLETED | OUTPATIENT
Start: 2022-07-31 | End: 2022-07-31

## 2022-07-31 RX ADMIN — IOPAMIDOL 100 ML: 612 INJECTION, SOLUTION INTRAVENOUS at 07:14

## 2022-07-31 RX ADMIN — ONDANSETRON 4 MG: 2 INJECTION INTRAMUSCULAR; INTRAVENOUS at 05:15

## 2022-07-31 RX ADMIN — KETOROLAC TROMETHAMINE 15 MG: 15 INJECTION, SOLUTION INTRAMUSCULAR; INTRAVENOUS at 05:15

## 2022-07-31 RX ADMIN — SODIUM CHLORIDE 1000 ML: 900 INJECTION, SOLUTION INTRAVENOUS at 05:49

## 2022-07-31 NOTE — ED NOTES
Bedside and Verbal shift change report given to [address] (oncoming nurse) by Robel Pedraza RN (offgoing nurse). Report included the following information SBAR, Kardex, ED Summary, Procedure Summary, Intake/Output and MAR.

## 2022-07-31 NOTE — DISCHARGE INSTRUCTIONS
Patient to hydrate yourself well and the follow closely with your primary doctor. Your diarrhea is persistent and you are not improving you may need to have further testing done including a stool sample. Please return if you are at all worsened or concerned.

## 2022-07-31 NOTE — ED NOTES
Assumed care, pt A&O x4, resp even and unlabored. NAD noted or indicated. Pt has no complaints or questions. Pt connected to monitor, VSS. RN to continue to monitor pt. Call bell within reach.

## 2022-07-31 NOTE — ED NOTES
Patient reevaluated states that pain much better after receiving IV Toradol. Discussed results with patient that include a UA that does not show urinary tract infection but does have red blood cells. Unremarkable LFTs but elevated white blood cell count which could be reactive secondary to diarrhea but since patient has elevated white count with abdominal pain and back pain that could be stone placed CT abdomen pelvis to assess for intra-abdominal pathology such as appendicitis diverticulitis or abscess. Patient is also still pending transvaginal ultrasound.

## 2022-07-31 NOTE — ED TRIAGE NOTES
A&O female with c/o lower abdominal and lower back pain with nausea and diarrhea that began approx 1 hr ago. Denies pain with urination. Denies vaginal discharge/bleeding.

## 2022-07-31 NOTE — ED NOTES
6:05 AM :Pt care assumed from Dr. Courtney Suarez , ED provider. Pt complaint(s), current treatment plan, progression and available diagnostic results have been discussed thoroughly. Rounding occurred: yes  Intended Disposition: Home   Pending diagnostic reports and/or labs (please list): TV US and CT AP     The patient was signed out to me at 6 AM by Dr. Courtney Suarez as the patient presents with a history of kidney stones and ovarian cyst and complains of bilateral lower quadrant pain and left flank pain. The patient's abdomen is mildly tender mostly in the left lower quadrant and left upper quadrant. Patient has leukocytosis and will follow the patient's ultrasound and CT and reevaluate. Jayna Salazar, DO 7:06 AM    The patient is feeling much better and was able to get some rest.  The patient had a CT scan that was reassuring suffered a renal stone which I do not suspect is causing her symptoms. In addition the patient's transvaginal ultrasound showed that she has an ovarian cyst however no evidence of rupture. The patient says she cannot tolerate many medications including NSAIDs and the IV dose was helpful however if she takes it by mouth she gets a lot of urinary pain. The patient has no obvious urinary tract infection. The patient does note that she has a history of recent bacterial infection of her throat and was treated with azithromycin and completed that dosing over the last several days. The patient says she is had multiple episodes of diarrhea but said she could not provide a stool sample now and told her that if her symptoms not improve she may need to have a stools study done. The patient has great insight to her care and will give her Tylenol, Lomotil, and have her follow closely with her primary doctor tomorrow and was given education on signs of worsening. The patient says her abdomen is feeling much better and will proceed with close outpatient care.     Workup and recommendations were reviewed with the patient and all questions were answered. The patient understands the plan and will proceed with close outpatient care. I have encouraged the patient to return if at all worsened or concerned. Ophelia Brochure, DO 8:42 AM    MEDICATIONS:    Medications   ketorolac (TORADOL) injection 15 mg (15 mg IntraVENous Given 7/31/22 0515)   ondansetron (ZOFRAN) injection 4 mg (4 mg IntraVENous Given 7/31/22 0515)   sodium chloride 0.9 % bolus infusion 1,000 mL (0 mL IntraVENous IV Completed 7/31/22 0650)   iopamidoL (ISOVUE 300) 61 % contrast injection 100 mL (100 mL IntraVENous Given 7/31/22 0714)            RESULTS:        CT ABD PELV W CONT   Final Result   . No CT evidence for acute process in the abdomen or pelvis. Left renal nonobstructive nephrolith. Probable left ovarian cyst. No free fluid. [address]   Final Result      Possible small left ovarian physiologic cyst. Ovaries otherwise within normal   limits. Similar bicornuate versus septate uterus, otherwise within normal limits. No free fluid. Abnormal Results this visit:  Labs Reviewed   CBC WITH AUTOMATED DIFF - Abnormal; Notable for the following components:       Result Value    WBC 18.3 (*)     NEUTROPHILS 76 (*)     LYMPHOCYTES 16 (*)     ABS. NEUTROPHILS 14.0 (*)     ABS. IMM. GRANS. 0.1 (*)     All other components within normal limits   METABOLIC PANEL, COMPREHENSIVE - Abnormal; Notable for the following components:    Glucose 115 (*)     Alk.  phosphatase 120 (*)     Globulin 4.3 (*)     All other components within normal limits   URINALYSIS W/ RFLX MICROSCOPIC   HCG URINE, QL       All Results past 24 hours:  Recent Results (from the past 24 hour(s))   URINALYSIS W/ RFLX MICROSCOPIC    Collection Time: 07/31/22  4:50 AM   Result Value Ref Range    Color YELLOW      Appearance CLEAR      Specific gravity 1.023 1.005 - 1.030      pH (UA) 5.0 5.0 - 8.0      Protein Negative NEG mg/dL    Glucose Negative NEG mg/dL    Ketone Negative NEG mg/dL    Bilirubin Negative NEG      Blood Negative NEG      Urobilinogen 1.0 0.2 - 1.0 EU/dL    Nitrites Negative NEG      Leukocyte Esterase Negative NEG     HCG URINE, QL    Collection Time: 07/31/22  4:50 AM   Result Value Ref Range    HCG urine, QL Negative NEG     CBC WITH AUTOMATED DIFF    Collection Time: 07/31/22  5:10 AM   Result Value Ref Range    WBC 18.3 (H) 4.6 - 13.2 K/uL    RBC 4.72 4.20 - 5.30 M/uL    HGB 14.3 12.0 - 16.0 g/dL    HCT 41.6 35.0 - 45.0 %    MCV 88.1 78.0 - 100.0 FL    MCH 30.3 24.0 - 34.0 PG    MCHC 34.4 31.0 - 37.0 g/dL    RDW 12.1 11.6 - 14.5 %    PLATELET 441 483 - 733 K/uL    MPV 10.4 9.2 - 11.8 FL    NRBC 0.0 0  WBC    ABSOLUTE NRBC 0.00 0.00 - 0.01 K/uL    NEUTROPHILS 76 (H) 40 - 73 %    LYMPHOCYTES 16 (L) 21 - 52 %    MONOCYTES 7 3 - 10 %    EOSINOPHILS 1 0 - 5 %    BASOPHILS 0 0 - 2 %    IMMATURE GRANULOCYTES 0 0.0 - 0.5 %    ABS. NEUTROPHILS 14.0 (H) 1.8 - 8.0 K/UL    ABS. LYMPHOCYTES 2.9 0.9 - 3.6 K/UL    ABS. MONOCYTES 1.2 0.05 - 1.2 K/UL    ABS. EOSINOPHILS 0.1 0.0 - 0.4 K/UL    ABS. BASOPHILS 0.0 0.0 - 0.1 K/UL    ABS. IMM. GRANS. 0.1 (H) 0.00 - 0.04 K/UL    DF AUTOMATED     METABOLIC PANEL, COMPREHENSIVE    Collection Time: 07/31/22  5:10 AM   Result Value Ref Range    Sodium 139 136 - 145 mmol/L    Potassium 3.8 3.5 - 5.5 mmol/L    Chloride 107 100 - 111 mmol/L    CO2 25 21 - 32 mmol/L    Anion gap 7 3.0 - 18 mmol/L    Glucose 115 (H) 74 - 99 mg/dL    BUN 8 7.0 - 18 MG/DL    Creatinine 0.64 0.6 - 1.3 MG/DL    BUN/Creatinine ratio 13 12 - 20      GFR est AA >60 >60 ml/min/1.73m2    GFR est non-AA >60 >60 ml/min/1.73m2    Calcium 9.4 8.5 - 10.1 MG/DL    Bilirubin, total 0.4 0.2 - 1.0 MG/DL    ALT (SGPT) 29 13 - 56 U/L    AST (SGOT) 25 10 - 38 U/L    Alk.  phosphatase 120 (H) 45 - 117 U/L    Protein, total 8.1 6.4 - 8.2 g/dL    Albumin 3.8 3.4 - 5.0 g/dL    Globulin 4.3 (H) 2.0 - 4.0 g/dL    A-G Ratio 0.9 0.8 - 1.7             CLINICAL IMPRESSION    1. Diarrhea, unspecified type    2.  Leukocytosis, unspecified type

## 2022-07-31 NOTE — ED PROVIDER NOTES
EMERGENCY DEPARTMENT HISTORY AND PHYSICAL EXAM    4:59 AM      Date: 7/31/2022  Patient Name: Gerard Carr    History of Presenting Illness     Chief Complaint   Patient presents with    Abdominal Pain    Back Pain    Diarrhea    Nausea         History Provided By: Patient  Location/Duration/Severity/Modifying factors   Patient is a 78-year-old female with history of ruptured ovarian cyst, kidney stones, anemia presenting due to nausea, lower back pain and abdominal pain. Patient states that starting last night she started to have abdominal cramping/discomfort in her lower abdomen with nausea and loose stools also associated lower back pain more so on her right lower lumbar area. Due to symptoms patient came to the emergency department for evaluation. She denies any vaginal bleeding or abnormal vaginal discharge. PCP: Jhon Morales MD    Current Facility-Administered Medications   Medication Dose Route Frequency Provider Last Rate Last Admin    ketorolac (TORADOL) injection 15 mg  15 mg IntraVENous ONCE Geoffery Schwab, MD        ondansetron [facility]) injection 4 mg  4 mg IntraVENous Flako Robertson MD         Current Outpatient Medications   Medication Sig Dispense Refill    sertraline (Zoloft) 25 mg tablet Take 1 Tablet by mouth daily.  30 Tablet 2       Past History     Past Medical History:  Past Medical History:   Diagnosis Date    ADHD (attention deficit hyperactivity disorder)     Adverse effect of anesthesia 2015    dermoid cyst on right ovary (woke up had some hives after general anesthesia)    Anemia     Bicornate uterus     GERD (gastroesophageal reflux disease)     Kidney stones     Pelvic mass     Unspecified adverse effect of anesthesia     hives with general anethesia    Von Willebrand disease ([facility] [address]     she does not have per mother       Past Surgical History:  Past Surgical History:   Procedure Laterality Date    HX COLONOSCOPY      HX LITHOTRIPSY  2015    HX OTHER SURGICAL BIRTH SHIAR REMOVED FROM BUTTOCKS @ 6WEEKS OLD SX. HX OTHER SURGICAL  2015    dermoid cyst removed       Family History:  Family History   Problem Relation Age of Onset    Heart Disease Maternal Grandfather     Heart Disease Paternal Grandmother     Diabetes Paternal Grandfather     No Known Problems Mother     No Known Problems Father     Heart Attack Paternal Uncle        Social History:  Social History     Tobacco Use    Smoking status: Every Day     Packs/day: 0.50     Types: Cigarettes    Smokeless tobacco: Current    Tobacco comments:     vape   Vaping Use    Vaping Use: Never used   Substance Use Topics    Alcohol use: Yes     Alcohol/week: 2.0 standard drinks     Types: 2 Glasses of wine per week    Drug use: Yes     Types: Marijuana       Allergies: Allergies   Allergen Reactions    Penicillin Rash    Amoxicillin Hives    Morphine Other (comments)     Denies allergy      Nsaids (Non-Steroidal Anti-Inflammatory Drug) Other (comments)         Review of Systems       Review of Systems   Constitutional:  Negative for activity change and fever. Respiratory:  Negative for chest tightness and shortness of breath. Cardiovascular:  Negative for chest pain. Gastrointestinal:  Positive for diarrhea and nausea. Negative for vomiting. Genitourinary:  Negative for difficulty urinating and dysuria. Musculoskeletal:  Positive for back pain. Skin: Negative. Physical Exam   Visit Vitals  BP (!) 138/94 (BP 1 Location: Left arm, BP Patient Position: At rest)   Pulse 81   Temp 97.8 °F (36.6 °C)   Resp 18   Ht 5' 6\" (1.676 m)   Wt 88.5 kg (195 lb)   LMP  (LMP Unknown)   SpO2 97%   BMI 31.47 kg/m²         Physical Exam  Vitals and nursing note reviewed. Constitutional:       General: She is not in acute distress. Appearance: She is well-developed. She is not ill-appearing. HENT:      Head: Normocephalic and atraumatic. Eyes:      Extraocular Movements: Extraocular movements intact.    Neck: Thyroid: No thyromegaly. Vascular: No JVD. Trachea: No tracheal deviation. Cardiovascular:      Rate and Rhythm: Normal rate and regular rhythm. Heart sounds: Normal heart sounds. No murmur heard. No gallop. Pulmonary:      Effort: Pulmonary effort is normal.      Breath sounds: Normal breath sounds. Chest:      Chest wall: No tenderness. Abdominal:      General: Bowel sounds are normal. There is no distension. Palpations: Abdomen is soft. Tenderness: There is abdominal tenderness in the right lower quadrant and left lower quadrant. There is no right CVA tenderness, left CVA tenderness, guarding or rebound. Hernia: No hernia is present. Comments: Mild tenderness to palpation in left lower and right lower quadrant   Musculoskeletal:         General: Normal range of motion. Cervical back: Normal range of motion and neck supple. Skin:     General: Skin is warm and dry. Neurological:      General: No focal deficit present. Mental Status: She is alert and oriented to person, place, and time. Psychiatric:         Behavior: Behavior normal.         Thought Content: Thought content normal.         Judgment: Judgment normal.         Diagnostic Study Results     Labs -  No results found for this or any previous visit (from the past 12 hour(s)). Radiologic Studies -   US TRANSVAGINAL    (Results Pending)         Medical Decision Making   I am the first provider for this patient. I reviewed the vital signs, available nursing notes, past medical history, past surgical history, family history and social history. Vital Signs-Reviewed the patient's vital signs.       EKG:     Records Reviewed: Nursing Notes (Time of Review: 4:59 AM)    ED Course: Progress Notes, Reevaluation, and Consults:         Provider Notes (Medical Decision Making):   MDM  Number of Diagnoses or Management Options  Diagnosis management comments: Patient is a 55-year-old female with history of ruptured ovarian cyst, kidney stones, anemia presenting due to nausea, lower back pain and abdominal pain. Patient presenting with symptoms that could be viral enteritis with nausea and diarrhea, musculoskeletal back pain, menstrual cramping this patient states that she has irregular periods and has not had a period for about a month, ovarian torsion/ruptured cyst with history of cysts rupture requiring removal, kidney stone but less likely without any CVA tenderness and symptomatically/clinically does not appear to be kidney stone. Plan to assess with CBC CMP UA urine pregnancy we will do transvaginal ultrasound. We will try to treat pain with Toradol as this should help kidney stone pain in any menstrual pelvic type pain. Also give Zofran for nausea. Patient's vitals stable and patient only appears slightly uncomfortable exam so if labs normal and ultrasound normal then likely will discharge home with patient to follow-up with her PCP. Procedures    Critical Care Time: none      Diagnosis     Clinical Impression: No diagnosis found. Disposition: Likely Home    Follow-up Information    None          Patient's Medications   Start Taking    No medications on file   Continue Taking    SERTRALINE (ZOLOFT) 25 MG TABLET    Take 1 Tablet by mouth daily. These Medications have changed    No medications on file   Stop Taking    No medications on file     Disclaimer: Sections of this note are dictated using utilizing voice recognition software. Minor typographical errors may be present. If questions arise, please do not hesitate to contact me or call our department.

## 2022-11-21 ENCOUNTER — OFFICE VISIT (OUTPATIENT)
Dept: FAMILY MEDICINE CLINIC | Age: 22
End: 2022-11-21
Payer: COMMERCIAL

## 2022-11-21 VITALS
OXYGEN SATURATION: 96 % | HEART RATE: 85 BPM | DIASTOLIC BLOOD PRESSURE: 78 MMHG | WEIGHT: 198 LBS | HEIGHT: 66 IN | BODY MASS INDEX: 31.82 KG/M2 | SYSTOLIC BLOOD PRESSURE: 120 MMHG | RESPIRATION RATE: 15 BRPM

## 2022-11-21 DIAGNOSIS — F32.A ANXIETY AND DEPRESSION: Primary | ICD-10-CM

## 2022-11-21 DIAGNOSIS — R21 RASH AND NONSPECIFIC SKIN ERUPTION: ICD-10-CM

## 2022-11-21 DIAGNOSIS — F41.9 ANXIETY AND DEPRESSION: Primary | ICD-10-CM

## 2022-11-21 PROCEDURE — 99214 OFFICE O/P EST MOD 30 MIN: CPT | Performed by: STUDENT IN AN ORGANIZED HEALTH CARE EDUCATION/TRAINING PROGRAM

## 2022-11-21 RX ORDER — TRIAMCINOLONE ACETONIDE 1 MG/G
CREAM TOPICAL 2 TIMES DAILY
Qty: 15 G | Refills: 1 | Status: SHIPPED | OUTPATIENT
Start: 2022-11-21

## 2022-11-21 RX ORDER — SERTRALINE HYDROCHLORIDE 50 MG/1
50 TABLET, FILM COATED ORAL DAILY
Qty: 30 TABLET | Refills: 2 | Status: SHIPPED | OUTPATIENT
Start: 2022-11-21

## 2022-11-21 RX ORDER — HYDROXYZINE 50 MG/1
50 TABLET, FILM COATED ORAL
Qty: 30 TABLET | Refills: 1 | Status: SHIPPED | OUTPATIENT
Start: 2022-11-21

## 2022-11-21 NOTE — PROGRESS NOTES
Leslie Mckenzie is a 23 y.o. female presenting today for Follow Up Chronic Condition (Would like to discuss increase dose of Zoloft. Has rash on bilateral hands for about 6 months. Has abdominal pain and was seen in July 2022, was told there was a possible cyst. Pt never had HFU and denies any pain today. Will make [place] for vaccines. )  . Chief Complaint   Patient presents with    Follow Up Chronic Condition     Would like to discuss increase dose of Zoloft. Has rash on bilateral hands for about 6 months. Has abdominal pain and was seen in July 2022, was told there was a possible cyst. Pt never had HFU and denies any pain today. Will make [place] for vaccines. HPI:  Leslie Mckenzie presents to the office today for follow up. Patient has a PMHx significant for anxiety. Anxiety: Patient is prescribed Zoloft. Used to be on 150 mg daily but cut down to 25 mg daily due to concerns of weight gain. Her  is currently deployed and since then she has been having worsening anxiety with frequent panic attacks. Her anxiety is usually better when her  is with her. Currently she is on Zoloft 25 mg daily but feels that due to her symptoms she should be on a higher dose. She has tried Lexapro and BuSpar in the past but they did not help her symptoms. Recurrent tonsillar stones: Patient was referred to ENT. Surgery was being scheduled but then patient canceled  due to her anxiety. Smoker: Currently smokes 0.5 packs per day. She is trying to herself wean off. States that she did quit for 3 weeks in the past but then she started smoking again because of social pressure. Today, patient also reports a rash on her bilateral hands. She has been picking at them. She has used topical steroid cream and lotions with no significant improvement. Review of Systems   Constitutional:  Negative for chills, diaphoresis, fever, malaise/fatigue and weight loss.    HENT:  Negative for congestion, ear discharge, ear pain, hearing loss, nosebleeds, sinus pain, sore throat and tinnitus. Eyes:  Negative for blurred vision, double vision and photophobia. Respiratory:  Negative for cough, sputum production, shortness of breath, wheezing and stridor. Cardiovascular:  Negative for chest pain, palpitations, orthopnea, claudication and leg swelling. Gastrointestinal:  Negative for abdominal pain, constipation, diarrhea, heartburn, nausea and vomiting. Genitourinary:  Negative for dysuria, flank pain, frequency, hematuria and urgency. Musculoskeletal:  Negative for back pain, joint pain, myalgias and neck pain. Skin:  Positive for itching and rash. Neurological:  Negative for tingling, tremors, sensory change, speech change, focal weakness, seizures, weakness and headaches. Psychiatric/Behavioral:  Negative for depression and suicidal ideas. The patient is nervous/anxious. All other systems reviewed and are negative.     Allergies   Allergen Reactions    Penicillin Rash    Amoxicillin Hives    Morphine Other (comments)     Denies allergy      Nsaids (Non-Steroidal Anti-Inflammatory Drug) Other (comments)       PHQ Screening   3 most recent PHQ Screens 11/21/2022   Little interest or pleasure in doing things More than half the days   Feeling down, depressed, irritable, or hopeless More than half the days   Total Score PHQ 2 4   Trouble falling or staying asleep, or sleeping too much More than half the days   Feeling tired or having little energy More than half the days   Poor appetite, weight loss, or overeating More than half the days   Feeling bad about yourself - or that you are a failure or have let yourself or your family down Not at all   Trouble concentrating on things such as school, work, reading, or watching TV Not at all   Moving or speaking so slowly that other people could have noticed; or the opposite being so fidgety that others notice Not at all   Thoughts of being better off dead, or hurting yourself in some way Not at all   PHQ 9 Score 10   How difficult have these problems made it for you to do your work, take care of your home and get along with others Somewhat difficult       History  Past Medical History:   Diagnosis Date    ADHD (attention deficit hyperactivity disorder)     Adverse effect of anesthesia 2015    dermoid cyst on right ovary (woke up had some hives after general anesthesia)    Anemia     Bicornate uterus     GERD (gastroesophageal reflux disease)     Kidney stones     Pelvic mass     Unspecified adverse effect of anesthesia     hives with general anethesia    Von Willebrand disease     she does not have per mother       Past Surgical History:   Procedure Laterality Date    HX COLONOSCOPY      HX LITHOTRIPSY  2015    HX OTHER SURGICAL      BIRTH SHIRA REMOVED FROM BUTTOCKS @ 6WEEKS OLD SX. HX OTHER SURGICAL  2015    dermoid cyst removed       Social History     Socioeconomic History    Marital status: SINGLE     Spouse name: Not on file    Number of children: Not on file    Years of education: Not on file    Highest education level: Not on file   Occupational History    Not on file   Tobacco Use    Smoking status: Every Day     Packs/day: 0.50     Types: Cigarettes    Smokeless tobacco: Current    Tobacco comments:     vape   Vaping Use    Vaping Use: Never used   Substance and Sexual Activity    Alcohol use:  Yes     Alcohol/week: 2.0 standard drinks     Types: 2 Glasses of wine per week    Drug use: Yes     Types: Marijuana    Sexual activity: Yes     Partners: Male     Birth control/protection: Condom   Other Topics Concern    Not on file   Social History Narrative    Not on file     Social Determinants of Health     Financial Resource Strain: Not on file   Food Insecurity: Not on file   Transportation Needs: Not on file   Physical Activity: Not on file   Stress: Not on file   Social Connections: Not on file   Intimate Partner Violence: Not on file   Housing Stability: Not on file       Current Outpatient Medications   Medication Sig Dispense Refill    sertraline (Zoloft) 50 mg tablet Take 1 Tablet by mouth daily. 30 Tablet 2    hydrOXYzine HCL (ATARAX) 50 mg tablet Take 1 Tablet by mouth every eight (8) hours as needed for Anxiety. 30 Tablet 1    triamcinolone acetonide (KENALOG) 0.1 % topical cream Apply  to affected area two (2) times a day. use thin layer 15 g 1    diphenoxylate-atropine (LomotiL) 2.5-0.025 mg per tablet Take 1 Tablet by mouth four (4) times daily as needed for Diarrhea (Take after each loose stool. ). Max Daily Amount: 4 Tablets. (Patient not taking: Reported on 11/21/2022) 5 Tablet 0    acetaminophen (TYLENOL) 325 mg tablet Take 2 Tablets by mouth every four (4) hours as needed for Pain. (Patient not taking: Reported on 11/21/2022) 20 Tablet 0         Vitals:    11/21/22 1035 11/21/22 1052   BP: (!) 128/101 120/78   Pulse: 85    Resp: 15    SpO2: 96%    Weight: 198 lb (89.8 kg)    Height: 5' 6\" (1.676 m)    PainSc:   0 - No pain        Physical Exam  Vitals and nursing note reviewed. Constitutional:       General: She is not in acute distress. Appearance: Normal appearance. She is not ill-appearing, toxic-appearing or diaphoretic. HENT:      Head: Normocephalic and atraumatic. Nose: Nose normal. No congestion or rhinorrhea. Mouth/Throat:      Mouth: Mucous membranes are moist.      Pharynx: Oropharynx is clear. Comments: Bilateral tonsillar enlargement. No tonsilloliths noted. Eyes:      General: No scleral icterus. Extraocular Movements: Extraocular movements intact. Conjunctiva/sclera: Conjunctivae normal.      Pupils: Pupils are equal, round, and reactive to light. Cardiovascular:      Rate and Rhythm: Normal rate and regular rhythm. Pulses: Normal pulses. Heart sounds: No murmur heard. No gallop. Pulmonary:      Effort: Pulmonary effort is normal. No respiratory distress.       Breath sounds: Normal breath sounds. No wheezing or rales. Abdominal:      General: Bowel sounds are normal. There is no distension. Palpations: Abdomen is soft. Tenderness: There is no abdominal tenderness. There is no guarding. Musculoskeletal:         General: Normal range of motion. Cervical back: Normal range of motion. Right lower leg: No edema. Left lower leg: No edema. Skin:     General: Skin is warm and dry. Coloration: Skin is not jaundiced or pale. Findings: Rash present. Comments: Rash with peeling - bilateral hands   Neurological:      General: No focal deficit present. Mental Status: She is alert and oriented to person, place, and time. Mental status is at baseline. Cranial Nerves: No cranial nerve deficit. Motor: No weakness. Gait: Gait normal.   Psychiatric:         Behavior: Behavior normal.         Thought Content: Thought content normal.         Judgment: Judgment normal.      Comments: anxious       No visits with results within 3 Month(s) from this visit. Latest known visit with results is:   Admission on 07/31/2022, Discharged on 07/31/2022   Component Date Value Ref Range Status    Color 07/31/2022 YELLOW    Final    Appearance 07/31/2022 CLEAR    Final    Specific gravity 07/31/2022 1.023  1.005 - 1.030   Final    pH (UA) 07/31/2022 5.0  5.0 - 8.0   Final    Protein 07/31/2022 Negative  NEG mg/dL Final    Glucose 07/31/2022 Negative  NEG mg/dL Final    Ketone 07/31/2022 Negative  NEG mg/dL Final    Bilirubin 07/31/2022 Negative  NEG   Final    Blood 07/31/2022 Negative  NEG   Final    Urobilinogen 07/31/2022 1.0  0.2 - 1.0 EU/dL Final    Nitrites 07/31/2022 Negative  NEG   Final    Leukocyte Esterase 07/31/2022 Negative  NEG   Final    HCG urine, QL 07/31/2022 Negative  NEG   Final    Test results should be confirmed using serum quantitative hCG when detection of pregnancy is critical and before performing any critical medical procedure.     WBC 07/31/2022 18.3 (A)  4.6 - 13.2 K/uL Final    RBC 07/31/2022 4.72  4.20 - 5.30 M/uL Final    HGB 07/31/2022 14.3  12.0 - 16.0 g/dL Final    HCT 07/31/2022 41.6  35.0 - 45.0 % Final    MCV 07/31/2022 88.1  78.0 - 100.0 FL Final    MCH 07/31/2022 30.3  24.0 - 34.0 PG Final    MCHC 07/31/2022 34.4  31.0 - 37.0 g/dL Final    RDW 07/31/2022 12.1  11.6 - 14.5 % Final    PLATELET 29/12/9259 339  135 - 420 K/uL Final    MPV 07/31/2022 10.4  9.2 - 11.8 FL Final    NRBC 07/31/2022 0.0  0  WBC Final    ABSOLUTE NRBC 07/31/2022 0.00  0.00 - 0.01 K/uL Final    NEUTROPHILS 07/31/2022 76 (A)  40 - 73 % Final    LYMPHOCYTES 07/31/2022 16 (A)  21 - 52 % Final    MONOCYTES 07/31/2022 7  3 - 10 % Final    EOSINOPHILS 07/31/2022 1  0 - 5 % Final    BASOPHILS 07/31/2022 0  0 - 2 % Final    IMMATURE GRANULOCYTES 07/31/2022 0  0.0 - 0.5 % Final    ABS. NEUTROPHILS 07/31/2022 14.0 (A)  1.8 - 8.0 K/UL Final    ABS. LYMPHOCYTES 07/31/2022 2.9  0.9 - 3.6 K/UL Final    ABS. MONOCYTES 07/31/2022 1.2  0.05 - 1.2 K/UL Final    ABS. EOSINOPHILS 07/31/2022 0.1  0.0 - 0.4 K/UL Final    ABS. BASOPHILS 07/31/2022 0.0  0.0 - 0.1 K/UL Final    ABS. IMM.  GRANS. 07/31/2022 0.1 (A)  0.00 - 0.04 K/UL Final    DF 07/31/2022 AUTOMATED    Final    Sodium 07/31/2022 139  136 - 145 mmol/L Final    Potassium 07/31/2022 3.8  3.5 - 5.5 mmol/L Final    Chloride 07/31/2022 107  100 - 111 mmol/L Final    CO2 07/31/2022 25  21 - 32 mmol/L Final    Anion gap 07/31/2022 7  3.0 - 18 mmol/L Final    Glucose 07/31/2022 115 (A)  74 - 99 mg/dL Final    BUN 07/31/2022 8  7.0 - 18 MG/DL Final    Creatinine 07/31/2022 0.64  0.6 - 1.3 MG/DL Final    BUN/Creatinine ratio 07/31/2022 13  12 - 20   Final    GFR est AA 07/31/2022 >60  >60 ml/min/1.73m2 Final    GFR est non-AA 07/31/2022 >60  >60 ml/min/1.73m2 Final    Comment: (NOTE)  Estimated GFR is calculated using the Modification of Diet in Renal   Disease (MDRD) Study equation, reported for both  [facility]) and non- Americans (GFRNA), and normalized to 1.73m2   body surface area. The physician must decide which value applies to   the patient. The MDRD study equation should only be used in   individuals age 25 or older. It has not been validated for the   following: pregnant women, patients with serious comorbid conditions,   or on certain medications, or persons with extremes of body size,   muscle mass, or nutritional status. Calcium 07/31/2022 9.4  8.5 - 10.1 MG/DL Final    Bilirubin, total 07/31/2022 0.4  0.2 - 1.0 MG/DL Final    ALT (SGPT) 07/31/2022 29  13 - 56 U/L Final    AST (SGOT) 07/31/2022 25  10 - 38 U/L Final    Alk. phosphatase 07/31/2022 120 (A)  45 - 117 U/L Final    Protein, total 07/31/2022 8.1  6.4 - 8.2 g/dL Final    Albumin 07/31/2022 3.8  3.4 - 5.0 g/dL Final    Globulin 07/31/2022 4.3 (A)  2.0 - 4.0 g/dL Final    A-G Ratio 07/31/2022 0.9  0.8 - 1.7   Final       No results found for any visits on 11/21/22. Patient Care Team:  Patient Care Team:  George Bran MD as PCP - General (Internal Medicine Physician)  George Bran MD as PCP - [facility] Provider      Assessment / Plan:      ICD-10-CM ICD-9-CM    1. Anxiety and depression  F41.9 300.00 sertraline (Zoloft) 50 mg tablet    F32. A 311 hydrOXYzine HCL (ATARAX) 50 mg tablet      2. Rash and nonspecific skin eruption  R21 782.1 triamcinolone acetonide (KENALOG) 0.1 % topical cream        Anxiety: Recently worsening symptoms. Increase Zoloft to 50 mg daily. Will prescribe hydroxyzine as needed for panic attacks. Follow-up in 8 weeks. May consider switching over to Prozac later once her anxiety is a bit stable due to decreased side effects of weight gain. Rash: Advised to use Aquaphor daily. Will prescribe triamcinolone cream. Advised to wear gloves when using detergent etc,     Recurrent tonsillar stones: Now following with ENT.             Follow-up and Dispositions    Return in about 8 weeks (around 1/16/2023). I asked the patient if she  had any questions and answered her  questions. The patient stated that she understands the treatment plan and agrees with the treatment plan    This document was created with a voice activated dictation system and may contain transcription errors.

## 2023-01-24 ENCOUNTER — OFFICE VISIT (OUTPATIENT)
Dept: FAMILY MEDICINE CLINIC | Age: 23
End: 2023-01-24
Payer: COMMERCIAL

## 2023-01-24 VITALS
RESPIRATION RATE: 16 BRPM | WEIGHT: 204 LBS | SYSTOLIC BLOOD PRESSURE: 118 MMHG | BODY MASS INDEX: 32.78 KG/M2 | HEART RATE: 90 BPM | DIASTOLIC BLOOD PRESSURE: 82 MMHG | HEIGHT: 66 IN | OXYGEN SATURATION: 98 %

## 2023-01-24 DIAGNOSIS — F32.A ANXIETY AND DEPRESSION: Primary | ICD-10-CM

## 2023-01-24 DIAGNOSIS — F41.9 ANXIETY AND DEPRESSION: Primary | ICD-10-CM

## 2023-01-24 DIAGNOSIS — R21 RASH AND NONSPECIFIC SKIN ERUPTION: ICD-10-CM

## 2023-01-24 PROCEDURE — 99214 OFFICE O/P EST MOD 30 MIN: CPT | Performed by: STUDENT IN AN ORGANIZED HEALTH CARE EDUCATION/TRAINING PROGRAM

## 2023-01-24 RX ORDER — SERTRALINE HYDROCHLORIDE 100 MG/1
100 TABLET, FILM COATED ORAL DAILY
Qty: 30 TABLET | Refills: 3 | Status: SHIPPED | OUTPATIENT
Start: 2023-01-24

## 2023-01-24 RX ORDER — LORAZEPAM 0.5 MG/1
0.5 TABLET ORAL
Qty: 30 TABLET | Refills: 1 | Status: SHIPPED | OUTPATIENT
Start: 2023-01-24

## 2023-01-24 NOTE — LETTER
1/24/2023 10:05 AM    Ms. Yong Menon  [address] [place], [place] [id number]      To Whom It May Concern:    Yong Menon is currently under the care of Dulce Langston. She is being treated for anxiety and depression. Patient has been experiencing worsening symptoms and is now being referred to psychiatry for further evaluation and management. If there are questions or concerns please have the patient contact our office.             Sincerely,      Consuelo Ulloa MD

## 2023-01-24 NOTE — PROGRESS NOTES
Martha Wilks is a 23 y.o. female presenting today for Medication Evaluation (Was recently increased Zoloft to 50mg, patient didn't feel effect and increased herself to 75mg with still minimal relief. Would like to discuss change. Believes to have PMDD, a few weeks prior, has increase paranoia, euphoria. Will make NV for pneumococcal and Flu vaccine at later date. Will sign release for PAP exam. )  . Chief Complaint   Patient presents with    Medication Evaluation     Was recently increased Zoloft to 50mg, patient didn't feel effect and increased herself to 75mg with still minimal relief. Would like to discuss change. Believes to have PMDD, a few weeks prior, has increase paranoia, euphoria. Will make NV for pneumococcal and Flu vaccine at later date. Will sign release for PAP exam.        HPI:  Martha Wilks presents to the office today for follow up. Patient has a PMHx significant for anxiety. Anxiety: Patient is prescribed Zoloft. Used to be on 150 mg daily but cut down to 25 mg daily due to concerns of weight gain. Her  is currently deployed and since then she has been having worsening anxiety with frequent panic attacks. Her anxiety is usually better when her  is with her. Last visit, dose of Zoloft was increased. She is now on Zoloft 75 mg daily. She is afraid of doing things because she is afraid of having a panic attack in public. She has noted this occurs more prior to her period. She has had to call out of her job multiple times due to severe anxiety. She has tried Effexor and BuSpar in the past but they did not help her symptoms. Recurrent tonsillar stones: Patient was referred to ENT. Surgery was being scheduled but then patient canceled  due to her anxiety. Smoker: Currently smokes 0.5 packs per day. She is trying to herself wean off. States that she did quit for 3 weeks in the past but then she started smoking again because of social pressure.         Review of Systems   Constitutional:  Negative for chills, diaphoresis, fever, malaise/fatigue and weight loss. HENT:  Negative for congestion, ear discharge, ear pain, hearing loss, nosebleeds, sinus pain, sore throat and tinnitus. Eyes:  Negative for blurred vision, double vision and photophobia. Respiratory:  Negative for cough, sputum production, shortness of breath, wheezing and stridor. Cardiovascular:  Negative for chest pain, palpitations, orthopnea, claudication and leg swelling. Gastrointestinal:  Negative for abdominal pain, constipation, diarrhea, heartburn, nausea and vomiting. Genitourinary:  Negative for dysuria, flank pain, frequency, hematuria and urgency. Musculoskeletal:  Negative for back pain, joint pain, myalgias and neck pain. Skin:  Positive for itching and rash. Neurological:  Negative for tingling, tremors, sensory change, speech change, focal weakness, seizures, weakness and headaches. Psychiatric/Behavioral:  Positive for depression and suicidal ideas. The patient is nervous/anxious. All other systems reviewed and are negative.     Allergies   Allergen Reactions    Penicillin Rash    Amoxicillin Hives    Morphine Other (comments)     Denies allergy      Nsaids (Non-Steroidal Anti-Inflammatory Drug) Other (comments)       PHQ Screening   3 most recent PHQ Screens 1/24/2023   Little interest or pleasure in doing things More than half the days   Feeling down, depressed, irritable, or hopeless More than half the days   Total Score PHQ 2 4   Trouble falling or staying asleep, or sleeping too much More than half the days   Feeling tired or having little energy More than half the days   Poor appetite, weight loss, or overeating More than half the days   Feeling bad about yourself - or that you are a failure or have let yourself or your family down Not at all   Trouble concentrating on things such as school, work, reading, or watching TV More than half the days   Moving or speaking so slowly that other people could have noticed; or the opposite being so fidgety that others notice Not at all   Thoughts of being better off dead, or hurting yourself in some way Several days   PHQ 9 Score 13   How difficult have these problems made it for you to do your work, take care of your home and get along with others Somewhat difficult       History  Past Medical History:   Diagnosis Date    ADHD (attention deficit hyperactivity disorder)     Adverse effect of anesthesia 2015    dermoid cyst on right ovary (woke up had some hives after general anesthesia)    Anemia     Bicornate uterus     GERD (gastroesophageal reflux disease)     Kidney stones     Pelvic mass     Unspecified adverse effect of anesthesia     hives with general anethesia    Von Willebrand disease     she does not have per mother       Past Surgical History:   Procedure Laterality Date    HX COLONOSCOPY      HX LITHOTRIPSY  2015    HX OTHER SURGICAL      BIRTH SHIRA REMOVED FROM BUTTOCKS @ 6WEEKS OLD SX.     HX OTHER SURGICAL  2015    dermoid cyst removed       Social History     Socioeconomic History    Marital status: SINGLE     Spouse name: Not on file    Number of children: Not on file    Years of education: Not on file    Highest education level: Not on file   Occupational History    Not on file   Tobacco Use    Smoking status: Every Day     Packs/day: 0.50     Types: Cigarettes    Smokeless tobacco: Current    Tobacco comments:     vape   Vaping Use    Vaping Use: Never used   Substance and Sexual Activity    Alcohol use: Not Currently     Alcohol/week: 2.0 standard drinks     Types: 2 Glasses of wine per week     Comment: has not had a drink in 6 months    Drug use: Yes     Types: Marijuana    Sexual activity: Yes     Partners: Male     Birth control/protection: Condom   Other Topics Concern    Not on file   Social History Narrative    Not on file     Social Determinants of Health     Financial Resource Strain: Not on file   Food Insecurity: Not on file   Transportation Needs: Not on file   Physical Activity: Not on file   Stress: Not on file   Social Connections: Not on file   Intimate Partner Violence: Not on file   Housing Stability: Not on file       Current Outpatient Medications   Medication Sig Dispense Refill    sertraline (ZOLOFT) 100 mg tablet Take 1 Tablet by mouth daily. 30 Tablet 3    LORazepam (ATIVAN) 0.5 mg tablet Take 1 Tablet by mouth every eight (8) hours as needed for Anxiety. Max Daily Amount: 1.5 mg. 30 Tablet 1    acetaminophen (TYLENOL) 325 mg tablet Take 2 Tablets by mouth every four (4) hours as needed for Pain. 20 Tablet 0    hydrOXYzine HCL (ATARAX) 50 mg tablet Take 1 Tablet by mouth every eight (8) hours as needed for Anxiety. (Patient not taking: Reported on 1/24/2023) 30 Tablet 1    triamcinolone acetonide (KENALOG) 0.1 % topical cream Apply  to affected area two (2) times a day. use thin layer (Patient not taking: Reported on 1/24/2023) 15 g 1    diphenoxylate-atropine (LomotiL) 2.5-0.025 mg per tablet Take 1 Tablet by mouth four (4) times daily as needed for Diarrhea (Take after each loose stool. ). Max Daily Amount: 4 Tablets. (Patient not taking: No sig reported) 5 Tablet 0         Vitals:    01/24/23 0948   BP: 118/82   Pulse: 90   Resp: 16   SpO2: 98%   Weight: 204 lb (92.5 kg)   Height: 5' 6\" (1.676 m)   PainSc:   0 - No pain   LMP: 01/23/2023       Physical Exam  Vitals and nursing note reviewed. Constitutional:       General: She is not in acute distress. Appearance: Normal appearance. She is not ill-appearing, toxic-appearing or diaphoretic. HENT:      Head: Normocephalic and atraumatic. Nose: Nose normal. No congestion or rhinorrhea. Mouth/Throat:      Mouth: Mucous membranes are moist.      Pharynx: Oropharynx is clear. Comments: Bilateral tonsillar enlargement. No tonsilloliths noted. Eyes:      General: No scleral icterus.      Extraocular Movements: Extraocular movements intact. Conjunctiva/sclera: Conjunctivae normal.      Pupils: Pupils are equal, round, and reactive to light. Cardiovascular:      Rate and Rhythm: Normal rate and regular rhythm. Pulses: Normal pulses. Heart sounds: No murmur heard. No gallop. Pulmonary:      Effort: Pulmonary effort is normal. No respiratory distress. Breath sounds: Normal breath sounds. No wheezing or rales. Abdominal:      General: Bowel sounds are normal. There is no distension. Palpations: Abdomen is soft. Tenderness: There is no abdominal tenderness. There is no guarding. Musculoskeletal:         General: Normal range of motion. Cervical back: Normal range of motion. Right lower leg: No edema. Left lower leg: No edema. Skin:     General: Skin is warm and dry. Coloration: Skin is not jaundiced or pale. Neurological:      General: No focal deficit present. Mental Status: She is alert and oriented to person, place, and time. Mental status is at baseline. Cranial Nerves: No cranial nerve deficit. Motor: No weakness. Gait: Gait normal.   Psychiatric:         Behavior: Behavior normal.         Thought Content: Thought content normal.         Judgment: Judgment normal.      Comments: Anxious, tearful       No visits with results within 3 Month(s) from this visit.    Latest known visit with results is:   Admission on 07/31/2022, Discharged on 07/31/2022   Component Date Value Ref Range Status    Color 07/31/2022 YELLOW    Final    Appearance 07/31/2022 CLEAR    Final    Specific gravity 07/31/2022 1.023  1.005 - 1.030   Final    pH (UA) 07/31/2022 5.0  5.0 - 8.0   Final    Protein 07/31/2022 Negative  NEG mg/dL Final    Glucose 07/31/2022 Negative  NEG mg/dL Final    Ketone 07/31/2022 Negative  NEG mg/dL Final    Bilirubin 07/31/2022 Negative  NEG   Final    Blood 07/31/2022 Negative  NEG   Final    Urobilinogen 07/31/2022 1.0  0.2 - 1.0 EU/dL Final    Nitrites 07/31/2022 Negative  NEG   Final    Leukocyte Esterase 07/31/2022 Negative  NEG   Final    HCG urine, QL 07/31/2022 Negative  NEG   Final    Test results should be confirmed using serum quantitative hCG when detection of pregnancy is critical and before performing any critical medical procedure. WBC 07/31/2022 18.3 (A)  4.6 - 13.2 K/uL Final    RBC 07/31/2022 4.72  4.20 - 5.30 M/uL Final    HGB 07/31/2022 14.3  12.0 - 16.0 g/dL Final    HCT 07/31/2022 41.6  35.0 - 45.0 % Final    MCV 07/31/2022 88.1  78.0 - 100.0 FL Final    MCH 07/31/2022 30.3  24.0 - 34.0 PG Final    MCHC 07/31/2022 34.4  31.0 - 37.0 g/dL Final    RDW 07/31/2022 12.1  11.6 - 14.5 % Final    PLATELET 15/17/9162 091  135 - 420 K/uL Final    MPV 07/31/2022 10.4  9.2 - 11.8 FL Final    NRBC 07/31/2022 0.0  0  WBC Final    ABSOLUTE NRBC 07/31/2022 0.00  0.00 - 0.01 K/uL Final    NEUTROPHILS 07/31/2022 76 (A)  40 - 73 % Final    LYMPHOCYTES 07/31/2022 16 (A)  21 - 52 % Final    MONOCYTES 07/31/2022 7  3 - 10 % Final    EOSINOPHILS 07/31/2022 1  0 - 5 % Final    BASOPHILS 07/31/2022 0  0 - 2 % Final    IMMATURE GRANULOCYTES 07/31/2022 0  0.0 - 0.5 % Final    ABS. NEUTROPHILS 07/31/2022 14.0 (A)  1.8 - 8.0 K/UL Final    ABS. LYMPHOCYTES 07/31/2022 2.9  0.9 - 3.6 K/UL Final    ABS. MONOCYTES 07/31/2022 1.2  0.05 - 1.2 K/UL Final    ABS. EOSINOPHILS 07/31/2022 0.1  0.0 - 0.4 K/UL Final    ABS. BASOPHILS 07/31/2022 0.0  0.0 - 0.1 K/UL Final    ABS. IMM.  GRANS. 07/31/2022 0.1 (A)  0.00 - 0.04 K/UL Final    DF 07/31/2022 AUTOMATED    Final    Sodium 07/31/2022 139  136 - 145 mmol/L Final    Potassium 07/31/2022 3.8  3.5 - 5.5 mmol/L Final    Chloride 07/31/2022 107  100 - 111 mmol/L Final    CO2 07/31/2022 25  21 - 32 mmol/L Final    Anion gap 07/31/2022 7  3.0 - 18 mmol/L Final    Glucose 07/31/2022 115 (A)  74 - 99 mg/dL Final    BUN 07/31/2022 8  7.0 - 18 MG/DL Final    Creatinine 07/31/2022 0.64  0.6 - 1.3 MG/DL Final    BUN/Creatinine ratio 07/31/2022 13  12 - 20   Final    GFR est AA 07/31/2022 >60  >60 ml/min/1.73m2 Final    GFR est non-AA 07/31/2022 >60  >60 ml/min/1.73m2 Final    Comment: (NOTE)  Estimated GFR is calculated using the Modification of Diet in Renal   Disease (MDRD) Study equation, reported for both  Americans   (GFRAA) and non- Americans (GFRNA), and normalized to 1.73m2   body surface area. The physician must decide which value applies to   the patient. The MDRD study equation should only be used in   individuals age 25 or older. It has not been validated for the   following: pregnant women, patients with serious comorbid conditions,   or on certain medications, or persons with extremes of body size,   muscle mass, or nutritional status. Calcium 07/31/2022 9.4  8.5 - 10.1 MG/DL Final    Bilirubin, total 07/31/2022 0.4  0.2 - 1.0 MG/DL Final    ALT (SGPT) 07/31/2022 29  13 - 56 U/L Final    AST (SGOT) 07/31/2022 25  10 - 38 U/L Final    Alk. phosphatase 07/31/2022 120 (A)  45 - 117 U/L Final    Protein, total 07/31/2022 8.1  6.4 - 8.2 g/dL Final    Albumin 07/31/2022 3.8  3.4 - 5.0 g/dL Final    Globulin 07/31/2022 4.3 (A)  2.0 - 4.0 g/dL Final    A-G Ratio 07/31/2022 0.9  0.8 - 1.7   Final       No results found for any visits on 01/24/23. Patient Care Team:  Patient Care Team:  Zen Hernandez MD as PCP - General (Internal Medicine Physician)  Zen Hernandez MD as PCP - [facility] Provider      Assessment / Plan:      ICD-10-CM ICD-9-CM    1. Anxiety and depression  F41.9 300.00 REFERRAL TO PSYCHIATRY    F32.A 311 sertraline (ZOLOFT) 100 mg tablet      LORazepam (ATIVAN) 0.5 mg tablet      2. Rash and nonspecific skin eruption  R21 782.1           Anxiety: Seeing anxiety and depression. Reports worsening panic attacks. She has also intermittently had suicidal thoughts-no active plan. She is afraid of tapering off the Zoloft. Will increase to 100 mg daily.   Prescribed Ativan #30 pills to use as needed for anxiety/panic attacks. Referred urgently to psychiatry. Rash: Resolved. Recurrent tonsillar stones: Now following with ENT. No recent exacerbations. Follow-up and Dispositions    Return in about 4 weeks (around 2/21/2023) for Depression/Anxiety f/u. I asked the patient if she  had any questions and answered her  questions. The patient stated that she understands the treatment plan and agrees with the treatment plan    This document was created with a voice activated dictation system and may contain transcription errors.

## 2023-02-13 ENCOUNTER — APPOINTMENT (OUTPATIENT)
Facility: HOSPITAL | Age: 23
End: 2023-02-13
Payer: OTHER GOVERNMENT

## 2023-02-13 ENCOUNTER — HOSPITAL ENCOUNTER (EMERGENCY)
Facility: HOSPITAL | Age: 23
Discharge: ANOTHER HOSPITAL | End: 2023-02-13
Attending: EMERGENCY MEDICINE
Payer: OTHER GOVERNMENT

## 2023-02-13 VITALS
RESPIRATION RATE: 15 BRPM | SYSTOLIC BLOOD PRESSURE: 128 MMHG | DIASTOLIC BLOOD PRESSURE: 92 MMHG | HEART RATE: 77 BPM | OXYGEN SATURATION: 100 % | TEMPERATURE: 97.4 F

## 2023-02-13 DIAGNOSIS — O00.90 ECTOPIC PREGNANCY, UNSPECIFIED LOCATION, UNSPECIFIED WHETHER INTRAUTERINE PREGNANCY PRESENT: Primary | ICD-10-CM

## 2023-02-13 DIAGNOSIS — R55 SYNCOPE AND COLLAPSE: ICD-10-CM

## 2023-02-13 DIAGNOSIS — R10.9 ABDOMINAL PAIN, UNSPECIFIED ABDOMINAL LOCATION: ICD-10-CM

## 2023-02-13 LAB
ABO + RH BLD: NORMAL
ALBUMIN SERPL-MCNC: 3.3 G/DL (ref 3.4–5)
ALBUMIN/GLOB SERPL: 0.9 (ref 0.8–1.7)
ALP SERPL-CCNC: 91 U/L (ref 45–117)
ALT SERPL-CCNC: 37 U/L (ref 13–56)
ANION GAP SERPL CALC-SCNC: 4 MMOL/L (ref 3–18)
APTT PPP: 28.4 SEC (ref 23–36.4)
AST SERPL-CCNC: 40 U/L (ref 10–38)
BASOPHILS # BLD: 0 K/UL (ref 0–0.1)
BASOPHILS NFR BLD: 0 % (ref 0–2)
BILIRUB SERPL-MCNC: 0.4 MG/DL (ref 0.2–1)
BLOOD GROUP ANTIBODIES SERPL: NORMAL
BUN SERPL-MCNC: 11 MG/DL (ref 7–18)
BUN/CREAT SERPL: 16 (ref 12–20)
CALCIUM SERPL-MCNC: 8.8 MG/DL (ref 8.5–10.1)
CHLORIDE SERPL-SCNC: 108 MMOL/L (ref 100–111)
CO2 SERPL-SCNC: 27 MMOL/L (ref 21–32)
CREAT SERPL-MCNC: 0.69 MG/DL (ref 0.6–1.3)
DIFFERENTIAL METHOD BLD: ABNORMAL
EOSINOPHIL # BLD: 0.1 K/UL (ref 0–0.4)
EOSINOPHIL NFR BLD: 1 % (ref 0–5)
ERYTHROCYTE [DISTWIDTH] IN BLOOD BY AUTOMATED COUNT: 12.5 % (ref 11.6–14.5)
GLOBULIN SER CALC-MCNC: 3.5 G/DL (ref 2–4)
GLUCOSE SERPL-MCNC: 89 MG/DL (ref 74–99)
HCG SERPL-ACNC: 399 MIU/ML (ref 0–10)
HCT VFR BLD AUTO: 37.6 % (ref 35–45)
HGB BLD-MCNC: 12.2 G/DL (ref 12–16)
IMM GRANULOCYTES # BLD AUTO: 0.1 K/UL (ref 0–0.04)
IMM GRANULOCYTES NFR BLD AUTO: 0 % (ref 0–0.5)
INR PPP: 0.9 (ref 0.8–1.2)
LACTATE BLD-SCNC: 1.85 MMOL/L (ref 0.4–2)
LYMPHOCYTES # BLD: 3.1 K/UL (ref 0.9–3.6)
LYMPHOCYTES NFR BLD: 24 % (ref 21–52)
MCH RBC QN AUTO: 29.5 PG (ref 24–34)
MCHC RBC AUTO-ENTMCNC: 32.4 G/DL (ref 31–37)
MCV RBC AUTO: 90.8 FL (ref 78–100)
MONOCYTES # BLD: 0.6 K/UL (ref 0.05–1.2)
MONOCYTES NFR BLD: 5 % (ref 3–10)
NEUTS SEG # BLD: 9.1 K/UL (ref 1.8–8)
NEUTS SEG NFR BLD: 70 % (ref 40–73)
NRBC # BLD: 0 K/UL (ref 0–0.01)
NRBC BLD-RTO: 0 PER 100 WBC
PLATELET # BLD AUTO: 248 K/UL (ref 135–420)
PMV BLD AUTO: 10.7 FL (ref 9.2–11.8)
POTASSIUM SERPL-SCNC: 4.3 MMOL/L (ref 3.5–5.5)
PROT SERPL-MCNC: 6.8 G/DL (ref 6.4–8.2)
PROTHROMBIN TIME: 12.9 SEC (ref 11.5–15.2)
RBC # BLD AUTO: 4.14 M/UL (ref 4.2–5.3)
SODIUM SERPL-SCNC: 139 MMOL/L (ref 136–145)
SPECIMEN EXP DATE BLD: NORMAL
WBC # BLD AUTO: 13 K/UL (ref 4.6–13.2)

## 2023-02-13 PROCEDURE — 84702 CHORIONIC GONADOTROPIN TEST: CPT

## 2023-02-13 PROCEDURE — 86901 BLOOD TYPING SEROLOGIC RH(D): CPT

## 2023-02-13 PROCEDURE — 83605 ASSAY OF LACTIC ACID: CPT

## 2023-02-13 PROCEDURE — 2580000003 HC RX 258: Performed by: EMERGENCY MEDICINE

## 2023-02-13 PROCEDURE — 96374 THER/PROPH/DIAG INJ IV PUSH: CPT

## 2023-02-13 PROCEDURE — 85610 PROTHROMBIN TIME: CPT

## 2023-02-13 PROCEDURE — 80053 COMPREHEN METABOLIC PANEL: CPT

## 2023-02-13 PROCEDURE — 76817 TRANSVAGINAL US OBSTETRIC: CPT

## 2023-02-13 PROCEDURE — 99285 EMERGENCY DEPT VISIT HI MDM: CPT

## 2023-02-13 PROCEDURE — 6360000002 HC RX W HCPCS: Performed by: EMERGENCY MEDICINE

## 2023-02-13 PROCEDURE — 85730 THROMBOPLASTIN TIME PARTIAL: CPT

## 2023-02-13 PROCEDURE — 85025 COMPLETE CBC W/AUTO DIFF WBC: CPT

## 2023-02-13 RX ORDER — MORPHINE SULFATE 2 MG/ML
2 INJECTION, SOLUTION INTRAMUSCULAR; INTRAVENOUS
Status: COMPLETED | OUTPATIENT
Start: 2023-02-13 | End: 2023-02-13

## 2023-02-13 RX ORDER — SODIUM CHLORIDE, SODIUM LACTATE, POTASSIUM CHLORIDE, CALCIUM CHLORIDE 600; 310; 30; 20 MG/100ML; MG/100ML; MG/100ML; MG/100ML
INJECTION, SOLUTION INTRAVENOUS CONTINUOUS
Status: DISCONTINUED | OUTPATIENT
Start: 2023-02-13 | End: 2023-02-13 | Stop reason: HOSPADM

## 2023-02-13 RX ORDER — SODIUM CHLORIDE, SODIUM LACTATE, POTASSIUM CHLORIDE, AND CALCIUM CHLORIDE .6; .31; .03; .02 G/100ML; G/100ML; G/100ML; G/100ML
1000 INJECTION, SOLUTION INTRAVENOUS ONCE
Status: COMPLETED | OUTPATIENT
Start: 2023-02-13 | End: 2023-02-13

## 2023-02-13 RX ADMIN — MORPHINE SULFATE 2 MG: 2 INJECTION, SOLUTION INTRAMUSCULAR; INTRAVENOUS at 08:25

## 2023-02-13 RX ADMIN — SODIUM CHLORIDE, SODIUM LACTATE, POTASSIUM CHLORIDE, AND CALCIUM CHLORIDE 1000 ML: 600; 310; 30; 20 INJECTION, SOLUTION INTRAVENOUS at 07:33

## 2023-02-13 ASSESSMENT — PAIN DESCRIPTION - ORIENTATION: ORIENTATION: LOWER

## 2023-02-13 ASSESSMENT — PAIN SCALES - GENERAL
PAINLEVEL_OUTOF10: 7
PAINLEVEL_OUTOF10: 7

## 2023-02-13 ASSESSMENT — ENCOUNTER SYMPTOMS
CHEST TIGHTNESS: 0
EYES NEGATIVE: 1
ABDOMINAL PAIN: 1

## 2023-02-13 ASSESSMENT — PAIN DESCRIPTION - LOCATION: LOCATION: ABDOMEN

## 2023-02-13 ASSESSMENT — PAIN DESCRIPTION - DESCRIPTORS: DESCRIPTORS: CRAMPING

## 2023-02-13 NOTE — ED PROVIDER NOTES
EMERGENCY DEPARTMENT HISTORY AND PHYSICAL EXAM    7:35 AM      Date: 2023  Patient Name: Yosi Pretty    History of Presenting Illness     Chief Complaint   Patient presents with    Loss of Consciousness    Ectopic Pregnancy         History Provided By: Patient  Location/Duration/Severity/Modifying factors   Patient is a 52-year-old female , ADHD, anemia, kidney stones, von Willebrand's disease, presents emergency department after having a syncopal event at home. Patient is approximately 6 weeks pregnant when she had what was thought to be a missed AB and subsequently had a D&C with a rising quant was ultimately found to have a left ectopic pregnancy per patient presents today after having increasing pain and a syncopal event at home. Patient notes the [facility] where she gets all of her care however could not go because she did not have her  ID so came to our emergency department. Patient is feeling lightheaded when she stands and has been having crampy abdominal pain. Patient denies any abdominal distention. Patient denies any other aggravating or alleviating factors. Patient has a history of smoking but stopped as soon as she found out she was pregnant, does not drink alcohol, does not currently smoke marijuana. PCP: Sis Daniels MD    Current Facility-Administered Medications   Medication Dose Route Frequency Provider Last Rate Last Admin    lactated ringers bolus  1,000 mL IntraVENous Once Charlene Quan .6 mL/hr at 23 0733 1,000 mL at 23 0733    lactated ringers IV soln infusion   IntraVENous Continuous Charlene Quan MD         No current outpatient medications on file.        Past History     Past Medical History:  Past Medical History:   Diagnosis Date    ADHD (attention deficit hyperactivity disorder)     Adverse effect of anesthesia     dermoid cyst on right ovary (woke up had some hives after general anesthesia)    Anemia Bicornate uterus     GERD (gastroesophageal reflux disease)     Kidney stones     Pelvic mass     Unspecified adverse effect of anesthesia     hives with general anethesia    Von Willebrand disease     she does not have per mother       Past Surgical History:  Past Surgical History:   Procedure Laterality Date    COLONOSCOPY      LITHOTRIPSY  2015    OTHER SURGICAL HISTORY  2015    dermoid cyst removed    OTHER SURGICAL HISTORY      BIRTH SPARKLE REMOVED FROM BUTTOCKS @ 6WEEKS OLD SX. Family History:  Family History   Problem Relation Age of Onset    Heart Disease Maternal Grandfather     No Known Problems Father     No Known Problems Mother     Diabetes Paternal Grandfather     Heart Disease Paternal Grandmother     Heart Attack Paternal Uncle        Social History:  Social History     Tobacco Use    Smoking status: Every Day     Packs/day: 0.50     Types: Cigarettes    Smokeless tobacco: Current   Substance Use Topics    Alcohol use: Not Currently     Alcohol/week: 2.0 standard drinks    Drug use: Yes     Types: Marijuana [employer])       Allergies: Allergies   Allergen Reactions    Penicillins Rash    Morphine Other (See Comments)     Denies allergy      Nsaids Other (See Comments) and Rash     bladder pain           Review of Systems       Review of Systems   Constitutional:  Negative for activity change and fatigue. HENT:  Negative for congestion. Eyes: Negative. Respiratory:  Negative for chest tightness. Cardiovascular:  Negative for chest pain. Gastrointestinal:  Positive for abdominal pain. Genitourinary:  Positive for vaginal bleeding. Negative for dysuria. Musculoskeletal:  Negative for arthralgias. Skin: Negative. Neurological:  Positive for syncope. Negative for weakness. Hematological: Negative. Physical Exam   BP (!) 120/92   Pulse 73   Temp 97.4 °F (36.3 °C) (Oral)   Resp 17   SpO2 100%       Physical Exam  Vitals and nursing note reviewed.    Constitutional: General: She is not in acute distress. Appearance: Normal appearance. HENT:      Head: Normocephalic and atraumatic. Right Ear: External ear normal.      Left Ear: External ear normal.      Nose: Nose normal.      Mouth/Throat:      Mouth: Mucous membranes are moist.   Eyes:      Extraocular Movements: Extraocular movements intact. Pupils: Pupils are equal, round, and reactive to light. Cardiovascular:      Rate and Rhythm: Normal rate. Pulmonary:      Effort: Pulmonary effort is normal.   Abdominal:      General: There is no distension. Palpations: Abdomen is soft. Tenderness: There is abdominal tenderness. Comments: Diffuse pain noted, point-of-care ultrasound was done with small amount of free fluid noted in the suprapubic region however no fluid in the splenorenal or hepatorenal views. Musculoskeletal:         General: Normal range of motion. Skin:     General: Skin is warm and dry. Capillary Refill: Capillary refill takes less than 2 seconds. Neurological:      General: No focal deficit present. Mental Status: She is alert and oriented to person, place, and time. Psychiatric:         Mood and Affect: Mood normal.         Behavior: Behavior normal.      Comments: Good insight to her care         Diagnostic Study Results     Labs -  No results found for this or any previous visit (from the past 12 hour(s)). Radiologic Studies -   US OB TRANSVAGINAL    (Results Pending)         Medical Decision Making   I am the first provider for this patient. I reviewed the vital signs, available nursing notes, past medical history, past surgical history, family history and social history. Vital Signs-Reviewed the patient's vital signs.       EKG: ***    Records Reviewed: {Records review:[id number]} (Time of Review: 7:35 AM)    ED Course: Progress Notes, Reevaluation, and Consults:    Provider Notes (Medical Decision Making):   MDM  Number of Diagnoses or Management Options  Diagnosis management comments: Patient is a 66-year-old female with a history of von Willebrand's disease and recently diagnosed with left ectopic pregnancy per patient being managed over at the [address] the presents emergency department after syncopal event this morning and increasing pain. Patient has difficulty standing however on my evaluation is diffusely tender without guarding and had a point-of-care ultrasound that showed no large amount of free fluid however did have some mild free fluid in suprapubic region. Have concerns for ruptured ectopic pregnancy in the setting of a known bleeding disorder have increasing concerns and started the transfer process [facility] is been getting her care. Will obtain labs, hydrate, follow the patient closely, and plan to transfer the patient. We will see if I can obtain transvaginal ultrasound while waiting for transfer but will not delay transfer for it. Nita Frias DO 7:40 AM        Patient has a history of von Willebrand's disease but she says it is mild and does not require any factor for surgery and has not required any factor in the in the past.  The patient does note she was given methotrexate for her ectopic pregnancy. Patient remains hemodynamically stable. Nita Frias DO 7:46 AM      Procedures    Critical Care Time: ***      Diagnosis     Clinical Impression: No diagnosis found. Disposition: ***    No follow-up provider specified. Disclaimer: Sections of this note are dictated using utilizing voice recognition software. Minor typographical errors may be present. If questions arise, please do not hesitate to contact me or call our department. failure of one or more body systems and/or organ systems due to likely ruptured ectopic pregnancy. Services included the following:  -reviewing nursing notes and old charts  -vital sign assessments  -direct patient care  -medication orders and management  -interpreting and reviewing diagnostic studies/labs  -re-evaluations  -documentation time    Aggregate critical care time was 38 minutes, which includes only time during which I was engaged in work directly related to the patient's care as described above, whether I was at bedside or elsewhere in the Emergency Department. It did not include time spent performing other reported procedures or the services of residents, students, nurses, or advance practice providers. Fany Smith DO 6:23 AM        Diagnosis     Clinical Impression:   1. Ectopic pregnancy, unspecified location, unspecified whether intrauterine pregnancy present    2. Abdominal pain, unspecified abdominal location    3. Syncope and collapse        Disposition: Transfer     No follow-up provider specified. Disclaimer: Sections of this note are dictated using utilizing voice recognition software. Minor typographical errors may be present. If questions arise, please do not hesitate to contact me or call our department.        Madalyn Gan MD  02/20/23 4102

## 2023-02-13 NOTE — ED NOTES
Pt's transport arrived, pt transferring to [facility], report given to [facility]. All questions answered.      Avani Diaz RN  02/13/23 5240

## 2023-02-13 NOTE — ED TRIAGE NOTES
Pt states she has an etopic pregnancy,  was seen three days ago. Pt states she keeps passing out.  Says it last for a minute

## 2023-02-13 NOTE — ED NOTES
Pt alert and oriented, tearful. States she is 6 weeks pregnant. Had d&c on Friday, then confirmed ectopic on Saturday and was given methotrexate. Pt reports increased abd pain that began last night around midnight with minimal vaginal bleeding. States she was walking to the bathroom at home and had syncopal episode.       Monalisa Vargas RN  02/13/23 0721

## 2023-02-21 LAB
EKG ATRIAL RATE: 71 BPM
EKG DIAGNOSIS: NORMAL
EKG P AXIS: 62 DEGREES
EKG P-R INTERVAL: 152 MS
EKG Q-T INTERVAL: 366 MS
EKG QRS DURATION: 84 MS
EKG QTC CALCULATION (BAZETT): 397 MS
EKG R AXIS: 59 DEGREES
EKG T AXIS: 15 DEGREES
EKG VENTRICULAR RATE: 71 BPM

## 2023-03-29 ENCOUNTER — OFFICE VISIT (OUTPATIENT)
Facility: CLINIC | Age: 23
End: 2023-03-29
Payer: OTHER GOVERNMENT

## 2023-03-29 VITALS
BODY MASS INDEX: 33.17 KG/M2 | DIASTOLIC BLOOD PRESSURE: 100 MMHG | HEART RATE: 79 BPM | RESPIRATION RATE: 14 BRPM | WEIGHT: 206.4 LBS | OXYGEN SATURATION: 99 % | SYSTOLIC BLOOD PRESSURE: 130 MMHG | HEIGHT: 66 IN

## 2023-03-29 DIAGNOSIS — F41.9 ANXIETY AND DEPRESSION: Primary | ICD-10-CM

## 2023-03-29 DIAGNOSIS — R03.0 ELEVATED BP WITHOUT DIAGNOSIS OF HYPERTENSION: ICD-10-CM

## 2023-03-29 DIAGNOSIS — F32.A ANXIETY AND DEPRESSION: Primary | ICD-10-CM

## 2023-03-29 PROCEDURE — 99214 OFFICE O/P EST MOD 30 MIN: CPT | Performed by: STUDENT IN AN ORGANIZED HEALTH CARE EDUCATION/TRAINING PROGRAM

## 2023-03-29 RX ORDER — ESCITALOPRAM OXALATE 10 MG/1
10 TABLET ORAL DAILY
Qty: 90 TABLET | Refills: 1 | Status: SHIPPED | OUTPATIENT
Start: 2023-03-29

## 2023-03-29 RX ORDER — ALPRAZOLAM 0.25 MG/1
0.25 TABLET ORAL 2 TIMES DAILY PRN
COMMUNITY
Start: 2023-01-26

## 2023-03-29 RX ORDER — CLINDAMYCIN HYDROCHLORIDE 300 MG/1
CAPSULE ORAL
COMMUNITY
Start: 2023-03-22

## 2023-03-29 RX ORDER — ESCITALOPRAM OXALATE 10 MG/1
10 TABLET ORAL DAILY
COMMUNITY
Start: 2023-02-23 | End: 2023-03-29 | Stop reason: SDUPTHER

## 2023-03-29 SDOH — ECONOMIC STABILITY: FOOD INSECURITY: WITHIN THE PAST 12 MONTHS, YOU WORRIED THAT YOUR FOOD WOULD RUN OUT BEFORE YOU GOT MONEY TO BUY MORE.: NEVER TRUE

## 2023-03-29 SDOH — ECONOMIC STABILITY: HOUSING INSECURITY
IN THE LAST 12 MONTHS, WAS THERE A TIME WHEN YOU DID NOT HAVE A STEADY PLACE TO SLEEP OR SLEPT IN A SHELTER (INCLUDING NOW)?: NO

## 2023-03-29 SDOH — ECONOMIC STABILITY: FOOD INSECURITY: WITHIN THE PAST 12 MONTHS, THE FOOD YOU BOUGHT JUST DIDN'T LAST AND YOU DIDN'T HAVE MONEY TO GET MORE.: NEVER TRUE

## 2023-03-29 SDOH — ECONOMIC STABILITY: INCOME INSECURITY: HOW HARD IS IT FOR YOU TO PAY FOR THE VERY BASICS LIKE FOOD, HOUSING, MEDICAL CARE, AND HEATING?: NOT HARD AT ALL

## 2023-03-29 ASSESSMENT — ENCOUNTER SYMPTOMS
CHEST TIGHTNESS: 0
ABDOMINAL PAIN: 0
BLOOD IN STOOL: 0
COUGH: 0
SHORTNESS OF BREATH: 0
NAUSEA: 0
BACK PAIN: 0
DIARRHEA: 0
WHEEZING: 0
RHINORRHEA: 0
VOMITING: 0

## 2023-03-29 NOTE — PROGRESS NOTES
numbness and headaches. Psychiatric/Behavioral:  Negative for agitation and confusion. The patient is nervous/anxious. All other systems reviewed and are negative. Allergies   Allergen Reactions    Penicillins Rash    Amoxicillin Hives    Morphine Other (See Comments)     Denies allergy      Nsaids Other (See Comments) and Rash     bladder pain         PHQ Screening   No flowsheet data found. History  Past Medical History:   Diagnosis Date    ADHD (attention deficit hyperactivity disorder)     Adverse effect of anesthesia 2015    dermoid cyst on right ovary (woke up had some hives after general anesthesia)    Anemia     Bicornate uterus     GERD (gastroesophageal reflux disease)     Kidney stones     Pelvic mass     Unspecified adverse effect of anesthesia     hives with general anethesia    Von Willebrand disease     she does not have per mother       Past Surgical History:   Procedure Laterality Date    COLONOSCOPY      LITHOTRIPSY  2015    OTHER SURGICAL HISTORY  2015    dermoid cyst removed    OTHER SURGICAL HISTORY      BIRTH SPARKLE REMOVED FROM BUTTOCKS @ 6WEEKS OLD SX.        Social History     Socioeconomic History    Marital status: Single     Spouse name: Not on file    Number of children: Not on file    Years of education: Not on file    Highest education level: Not on file   Occupational History    Not on file   Tobacco Use    Smoking status: Every Day     Packs/day: 0.50     Types: Cigarettes    Smokeless tobacco: Current   Substance and Sexual Activity    Alcohol use: Not Currently     Alcohol/week: 2.0 standard drinks    Drug use: Yes     Types: Marijuana Berneta Sekou)    Sexual activity: Not on file   Other Topics Concern    Not on file   Social History Narrative    Not on file     Social Determinants of Health     Financial Resource Strain: Low Risk     Difficulty of Paying Living Expenses: Not hard at all   Food Insecurity: No Food Insecurity    Worried About [address] in the Last

## 2023-06-21 ENCOUNTER — OFFICE VISIT (OUTPATIENT)
Facility: CLINIC | Age: 23
End: 2023-06-21
Payer: OTHER GOVERNMENT

## 2023-06-21 VITALS
SYSTOLIC BLOOD PRESSURE: 123 MMHG | OXYGEN SATURATION: 99 % | HEIGHT: 66 IN | RESPIRATION RATE: 16 BRPM | HEART RATE: 89 BPM | DIASTOLIC BLOOD PRESSURE: 98 MMHG | BODY MASS INDEX: 32.66 KG/M2 | WEIGHT: 203.2 LBS | TEMPERATURE: 97.8 F

## 2023-06-21 DIAGNOSIS — F32.A ANXIETY AND DEPRESSION: ICD-10-CM

## 2023-06-21 DIAGNOSIS — F41.0 PANIC ATTACKS: Primary | ICD-10-CM

## 2023-06-21 DIAGNOSIS — F41.9 ANXIETY AND DEPRESSION: ICD-10-CM

## 2023-06-21 PROCEDURE — 99214 OFFICE O/P EST MOD 30 MIN: CPT | Performed by: STUDENT IN AN ORGANIZED HEALTH CARE EDUCATION/TRAINING PROGRAM

## 2023-06-21 RX ORDER — ESCITALOPRAM OXALATE 5 MG/1
5 TABLET ORAL DAILY
Qty: 90 TABLET | Refills: 1 | Status: SHIPPED | OUTPATIENT
Start: 2023-06-21

## 2023-06-21 RX ORDER — ESCITALOPRAM OXALATE 10 MG/1
10 TABLET ORAL DAILY
Qty: 90 TABLET | Refills: 1 | Status: SHIPPED | OUTPATIENT
Start: 2023-06-21

## 2023-06-21 SDOH — ECONOMIC STABILITY: FOOD INSECURITY: WITHIN THE PAST 12 MONTHS, THE FOOD YOU BOUGHT JUST DIDN'T LAST AND YOU DIDN'T HAVE MONEY TO GET MORE.: NEVER TRUE

## 2023-06-21 SDOH — ECONOMIC STABILITY: FOOD INSECURITY: WITHIN THE PAST 12 MONTHS, YOU WORRIED THAT YOUR FOOD WOULD RUN OUT BEFORE YOU GOT MONEY TO BUY MORE.: NEVER TRUE

## 2023-06-21 SDOH — ECONOMIC STABILITY: INCOME INSECURITY: HOW HARD IS IT FOR YOU TO PAY FOR THE VERY BASICS LIKE FOOD, HOUSING, MEDICAL CARE, AND HEATING?: NOT HARD AT ALL

## 2023-06-21 ASSESSMENT — PATIENT HEALTH QUESTIONNAIRE - PHQ9
SUM OF ALL RESPONSES TO PHQ9 QUESTIONS 1 & 2: 2
SUM OF ALL RESPONSES TO PHQ QUESTIONS 1-9: 9
2. FEELING DOWN, DEPRESSED OR HOPELESS: 1
6. FEELING BAD ABOUT YOURSELF - OR THAT YOU ARE A FAILURE OR HAVE LET YOURSELF OR YOUR FAMILY DOWN: 1
SUM OF ALL RESPONSES TO PHQ QUESTIONS 1-9: 9
8. MOVING OR SPEAKING SO SLOWLY THAT OTHER PEOPLE COULD HAVE NOTICED. OR THE OPPOSITE, BEING SO FIGETY OR RESTLESS THAT YOU HAVE BEEN MOVING AROUND A LOT MORE THAN USUAL: 1
SUM OF ALL RESPONSES TO PHQ QUESTIONS 1-9: 8
9. THOUGHTS THAT YOU WOULD BE BETTER OFF DEAD, OR OF HURTING YOURSELF: 1
5. POOR APPETITE OR OVEREATING: 1
3. TROUBLE FALLING OR STAYING ASLEEP: 1
7. TROUBLE CONCENTRATING ON THINGS, SUCH AS READING THE NEWSPAPER OR WATCHING TELEVISION: 1
1. LITTLE INTEREST OR PLEASURE IN DOING THINGS: 1
4. FEELING TIRED OR HAVING LITTLE ENERGY: 1
10. IF YOU CHECKED OFF ANY PROBLEMS, HOW DIFFICULT HAVE THESE PROBLEMS MADE IT FOR YOU TO DO YOUR WORK, TAKE CARE OF THINGS AT HOME, OR GET ALONG WITH OTHER PEOPLE: 1
SUM OF ALL RESPONSES TO PHQ QUESTIONS 1-9: 9

## 2023-06-21 ASSESSMENT — ENCOUNTER SYMPTOMS
DIARRHEA: 0
WHEEZING: 0
NAUSEA: 0
BLOOD IN STOOL: 0
ABDOMINAL PAIN: 0
VOMITING: 0
CHEST TIGHTNESS: 0
RHINORRHEA: 0
BACK PAIN: 0
SHORTNESS OF BREATH: 0
COUGH: 0

## 2023-06-21 NOTE — PROGRESS NOTES
Genitourinary:  Negative for decreased urine volume, dysuria, frequency and pelvic pain. Musculoskeletal:  Negative for back pain, myalgias and neck pain. Neurological:  Negative for dizziness, tremors, seizures, syncope, speech difficulty, weakness, numbness and headaches. Psychiatric/Behavioral:  Negative for agitation and confusion. The patient is nervous/anxious. All other systems reviewed and are negative. Allergies   Allergen Reactions    Penicillins Rash    Amoxicillin Hives    Morphine Other (See Comments)     Denies allergy      Nsaids Other (See Comments) and Rash     bladder pain         PHQ Screening   No flowsheet data found. History  Past Medical History:   Diagnosis Date    ADHD (attention deficit hyperactivity disorder)     Adverse effect of anesthesia 2015    dermoid cyst on right ovary (woke up had some hives after general anesthesia)    Anemia     Bicornate uterus     GERD (gastroesophageal reflux disease)     Kidney stones     Pelvic mass     Unspecified adverse effect of anesthesia     hives with general anethesia    Von Willebrand disease ([facility] [address]     she does not have per mother       Past Surgical History:   Procedure Laterality Date    COLONOSCOPY      LITHOTRIPSY  2015    OTHER SURGICAL HISTORY  2015    dermoid cyst removed    OTHER SURGICAL HISTORY      BIRTH SPARKLE REMOVED FROM BUTTOCKS @ 6WEEKS OLD SX.        Social History     Socioeconomic History    Marital status: Single     Spouse name: Not on file    Number of children: Not on file    Years of education: Not on file    Highest education level: Not on file   Occupational History    Not on file   Tobacco Use    Smoking status: Every Day     Packs/day: 0.50     Types: Cigarettes    Smokeless tobacco: Current   Substance and Sexual Activity    Alcohol use: Not Currently     Alcohol/week: 2.0 standard drinks    Drug use: Yes     Types: Marijuana Bobbye Fat)    Sexual activity: Not on file   Other Topics Concern    Not on file

## 2024-01-02 DIAGNOSIS — F41.9 ANXIETY AND DEPRESSION: ICD-10-CM

## 2024-01-02 DIAGNOSIS — F32.A ANXIETY AND DEPRESSION: ICD-10-CM

## 2024-01-02 RX ORDER — ESCITALOPRAM OXALATE 10 MG/1
10 TABLET ORAL DAILY
Qty: 90 TABLET | Refills: 1 | Status: SHIPPED | OUTPATIENT
Start: 2024-01-02

## 2024-01-02 NOTE — TELEPHONE ENCOUNTER
----- Message from Sowmya Yeung sent at 12/22/2023  4:21 PM EST -----  Subject: Refill Request    QUESTIONS  Name of Medication? escitalopram (LEXAPRO) 10 MG tablet  Patient-reported dosage and instructions? 10 MG  How many days do you have left? 6  Preferred Pharmacy? [facility] #[id number]  Pharmacy phone number (if available)? [phone]  Additional Information for Provider? Patient has scheduled appointment for   1/10/2024 and will not have enough medication.  ---------------------------------------------------------------------------  --------------  CALL BACK INFO  What is the best way for the office to contact you? OK to leave message on   voicemail  Preferred Call Back Phone Number? [phone]  ---------------------------------------------------------------------------  --------------  SCRIPT ANSWERS  Relationship to Patient? Self

## 2024-01-10 ENCOUNTER — OFFICE VISIT (OUTPATIENT)
Facility: CLINIC | Age: 24
End: 2024-01-10
Payer: COMMERCIAL

## 2024-01-10 VITALS
BODY MASS INDEX: 33.27 KG/M2 | HEART RATE: 86 BPM | HEIGHT: 66 IN | TEMPERATURE: 98.6 F | DIASTOLIC BLOOD PRESSURE: 80 MMHG | WEIGHT: 207 LBS | RESPIRATION RATE: 16 BRPM | SYSTOLIC BLOOD PRESSURE: 129 MMHG | OXYGEN SATURATION: 99 %

## 2024-01-10 DIAGNOSIS — F32.A ANXIETY AND DEPRESSION: ICD-10-CM

## 2024-01-10 DIAGNOSIS — F41.0 PANIC ATTACKS: Primary | ICD-10-CM

## 2024-01-10 DIAGNOSIS — F41.9 ANXIETY AND DEPRESSION: ICD-10-CM

## 2024-01-10 PROCEDURE — 99214 OFFICE O/P EST MOD 30 MIN: CPT | Performed by: STUDENT IN AN ORGANIZED HEALTH CARE EDUCATION/TRAINING PROGRAM

## 2024-01-10 RX ORDER — ESCITALOPRAM OXALATE 10 MG/1
10 TABLET ORAL DAILY
Qty: 90 TABLET | Refills: 1 | Status: CANCELLED | OUTPATIENT
Start: 2024-01-10

## 2024-01-10 RX ORDER — ALPRAZOLAM 0.25 MG/1
0.25 TABLET ORAL 2 TIMES DAILY PRN
Qty: 30 TABLET | Refills: 0 | Status: SHIPPED | OUTPATIENT
Start: 2024-01-10 | End: 2024-03-07

## 2024-01-10 SDOH — ECONOMIC STABILITY: INCOME INSECURITY: HOW HARD IS IT FOR YOU TO PAY FOR THE VERY BASICS LIKE FOOD, HOUSING, MEDICAL CARE, AND HEATING?: NOT HARD AT ALL

## 2024-01-10 SDOH — ECONOMIC STABILITY: FOOD INSECURITY: WITHIN THE PAST 12 MONTHS, THE FOOD YOU BOUGHT JUST DIDN'T LAST AND YOU DIDN'T HAVE MONEY TO GET MORE.: NEVER TRUE

## 2024-01-10 SDOH — ECONOMIC STABILITY: FOOD INSECURITY: WITHIN THE PAST 12 MONTHS, YOU WORRIED THAT YOUR FOOD WOULD RUN OUT BEFORE YOU GOT MONEY TO BUY MORE.: NEVER TRUE

## 2024-01-10 ASSESSMENT — ENCOUNTER SYMPTOMS
BACK PAIN: 0
WHEEZING: 0
NAUSEA: 0
RHINORRHEA: 0
COUGH: 0
ABDOMINAL PAIN: 0
CHEST TIGHTNESS: 0
DIARRHEA: 0
BLOOD IN STOOL: 0
VOMITING: 0
SHORTNESS OF BREATH: 0

## 2024-01-10 ASSESSMENT — PATIENT HEALTH QUESTIONNAIRE - PHQ9
8. MOVING OR SPEAKING SO SLOWLY THAT OTHER PEOPLE COULD HAVE NOTICED. OR THE OPPOSITE, BEING SO FIGETY OR RESTLESS THAT YOU HAVE BEEN MOVING AROUND A LOT MORE THAN USUAL: 0
SUM OF ALL RESPONSES TO PHQ QUESTIONS 1-9: 0
3. TROUBLE FALLING OR STAYING ASLEEP: 0
7. TROUBLE CONCENTRATING ON THINGS, SUCH AS READING THE NEWSPAPER OR WATCHING TELEVISION: 0
6. FEELING BAD ABOUT YOURSELF - OR THAT YOU ARE A FAILURE OR HAVE LET YOURSELF OR YOUR FAMILY DOWN: 0
10. IF YOU CHECKED OFF ANY PROBLEMS, HOW DIFFICULT HAVE THESE PROBLEMS MADE IT FOR YOU TO DO YOUR WORK, TAKE CARE OF THINGS AT HOME, OR GET ALONG WITH OTHER PEOPLE: 0
4. FEELING TIRED OR HAVING LITTLE ENERGY: 0
SUM OF ALL RESPONSES TO PHQ QUESTIONS 1-9: 0
2. FEELING DOWN, DEPRESSED OR HOPELESS: 0
SUM OF ALL RESPONSES TO PHQ QUESTIONS 1-9: 0
5. POOR APPETITE OR OVEREATING: 0
SUM OF ALL RESPONSES TO PHQ QUESTIONS 1-9: 0
1. LITTLE INTEREST OR PLEASURE IN DOING THINGS: 0
9. THOUGHTS THAT YOU WOULD BE BETTER OFF DEAD, OR OF HURTING YOURSELF: 0
SUM OF ALL RESPONSES TO PHQ9 QUESTIONS 1 & 2: 0

## 2024-01-10 ASSESSMENT — ANXIETY QUESTIONNAIRES
5. BEING SO RESTLESS THAT IT IS HARD TO SIT STILL: 2
IF YOU CHECKED OFF ANY PROBLEMS ON THIS QUESTIONNAIRE, HOW DIFFICULT HAVE THESE PROBLEMS MADE IT FOR YOU TO DO YOUR WORK, TAKE CARE OF THINGS AT HOME, OR GET ALONG WITH OTHER PEOPLE: SOMEWHAT DIFFICULT
7. FEELING AFRAID AS IF SOMETHING AWFUL MIGHT HAPPEN: 1
2. NOT BEING ABLE TO STOP OR CONTROL WORRYING: 3
1. FEELING NERVOUS, ANXIOUS, OR ON EDGE: 3
4. TROUBLE RELAXING: 2
3. WORRYING TOO MUCH ABOUT DIFFERENT THINGS: 3

## 2024-01-10 NOTE — PROGRESS NOTES
weakness, numbness and headaches.   Psychiatric/Behavioral:  Negative for agitation and confusion. The patient is nervous/anxious.    All other systems reviewed and are negative.        Allergies   Allergen Reactions    Penicillins Rash    Amoxicillin Hives    Morphine Other (See Comments)     Denies allergy      Nsaids Other (See Comments) and Rash     bladder pain         PHQ Screening   PHQ-9 Total Score: 0 (1/10/2024  4:41 PM)  Thoughts that you would be better off dead, or of hurting yourself in some way: 0 (1/10/2024  4:41 PM)       History  Past Medical History:   Diagnosis Date    ADHD (attention deficit hyperactivity disorder)     Adverse effect of anesthesia 2015    dermoid cyst on right ovary (woke up had some hives after general anesthesia)    Anemia     Bicornate uterus     GERD (gastroesophageal reflux disease)     Kidney stones     Pelvic mass     Unspecified adverse effect of anesthesia     hives with general anethesia    Von Willebrand disease (HCC)     she does not have per mother       Past Surgical History:   Procedure Laterality Date    COLONOSCOPY      LITHOTRIPSY  2015    OTHER SURGICAL HISTORY  2015    dermoid cyst removed    OTHER SURGICAL HISTORY      BIRTH SPARKLE REMOVED FROM BUTTOCKS @ 8 WEEKS OLD SX.       Social History     Socioeconomic History    Marital status: Single     Spouse name: Not on file    Number of children: Not on file    Years of education: Not on file    Highest education level: Not on file   Occupational History    Not on file   Tobacco Use    Smoking status: Every Day     Current packs/day: 0.50     Types: Cigarettes    Smokeless tobacco: Current   Vaping Use    Vaping Use: Never used   Substance and Sexual Activity    Alcohol use: Not Currently     Alcohol/week: 2.0 standard drinks of alcohol    Drug use: Not Currently    Sexual activity: Not Currently     Birth control/protection: Condom   Other Topics Concern    Not on file   Social History Narrative    Not on file

## 2024-07-01 DIAGNOSIS — F32.A ANXIETY AND DEPRESSION: ICD-10-CM

## 2024-07-01 DIAGNOSIS — F41.9 ANXIETY AND DEPRESSION: ICD-10-CM

## 2024-07-01 NOTE — TELEPHONE ENCOUNTER
Patient request refill for the following to [facility], [address]   Lexapr  Patient states she is leaving for vacation on Sunday, July 7th.

## 2024-07-03 DIAGNOSIS — F41.9 ANXIETY AND DEPRESSION: ICD-10-CM

## 2024-07-03 DIAGNOSIS — F32.A ANXIETY AND DEPRESSION: ICD-10-CM

## 2024-07-05 ENCOUNTER — TELEPHONE (OUTPATIENT)
Facility: CLINIC | Age: 24
End: 2024-07-05

## 2024-07-05 DIAGNOSIS — F41.9 ANXIETY AND DEPRESSION: ICD-10-CM

## 2024-07-05 DIAGNOSIS — F32.A ANXIETY AND DEPRESSION: ICD-10-CM

## 2024-07-05 RX ORDER — ESCITALOPRAM OXALATE 10 MG/1
10 TABLET ORAL DAILY
Qty: 90 TABLET | Refills: 1 | OUTPATIENT
Start: 2024-07-05

## 2024-07-05 RX ORDER — ESCITALOPRAM OXALATE 10 MG/1
10 TABLET ORAL DAILY
Qty: 90 TABLET | Refills: 1 | Status: SHIPPED | OUTPATIENT
Start: 2024-07-05

## 2024-07-05 NOTE — TELEPHONE ENCOUNTER
Pt request refill on medication completely out      Lexapro 10 mg      Location      [place]     Advised PT provider will respond to message

## 2024-07-17 ENCOUNTER — OFFICE VISIT (OUTPATIENT)
Facility: CLINIC | Age: 24
End: 2024-07-17
Payer: OTHER GOVERNMENT

## 2024-07-17 VITALS
WEIGHT: 215 LBS | HEIGHT: 66 IN | BODY MASS INDEX: 34.55 KG/M2 | DIASTOLIC BLOOD PRESSURE: 76 MMHG | RESPIRATION RATE: 16 BRPM | TEMPERATURE: 98.6 F | SYSTOLIC BLOOD PRESSURE: 107 MMHG | HEART RATE: 83 BPM | OXYGEN SATURATION: 96 %

## 2024-07-17 DIAGNOSIS — F32.A ANXIETY AND DEPRESSION: Primary | ICD-10-CM

## 2024-07-17 DIAGNOSIS — F41.0 PANIC ATTACKS: ICD-10-CM

## 2024-07-17 DIAGNOSIS — Z3A.23 23 WEEKS GESTATION OF PREGNANCY: ICD-10-CM

## 2024-07-17 DIAGNOSIS — F41.9 ANXIETY AND DEPRESSION: Primary | ICD-10-CM

## 2024-07-17 PROCEDURE — 99213 OFFICE O/P EST LOW 20 MIN: CPT | Performed by: STUDENT IN AN ORGANIZED HEALTH CARE EDUCATION/TRAINING PROGRAM

## 2024-07-17 SDOH — ECONOMIC STABILITY: INCOME INSECURITY: HOW HARD IS IT FOR YOU TO PAY FOR THE VERY BASICS LIKE FOOD, HOUSING, MEDICAL CARE, AND HEATING?: NOT HARD AT ALL

## 2024-07-17 SDOH — ECONOMIC STABILITY: FOOD INSECURITY: WITHIN THE PAST 12 MONTHS, THE FOOD YOU BOUGHT JUST DIDN'T LAST AND YOU DIDN'T HAVE MONEY TO GET MORE.: NEVER TRUE

## 2024-07-17 SDOH — ECONOMIC STABILITY: FOOD INSECURITY: WITHIN THE PAST 12 MONTHS, YOU WORRIED THAT YOUR FOOD WOULD RUN OUT BEFORE YOU GOT MONEY TO BUY MORE.: NEVER TRUE

## 2024-07-17 ASSESSMENT — ANXIETY QUESTIONNAIRES
6. BECOMING EASILY ANNOYED OR IRRITABLE: NOT AT ALL
GAD7 TOTAL SCORE: 6
2. NOT BEING ABLE TO STOP OR CONTROL WORRYING: SEVERAL DAYS
3. WORRYING TOO MUCH ABOUT DIFFERENT THINGS: SEVERAL DAYS
1. FEELING NERVOUS, ANXIOUS, OR ON EDGE: SEVERAL DAYS
IF YOU CHECKED OFF ANY PROBLEMS ON THIS QUESTIONNAIRE, HOW DIFFICULT HAVE THESE PROBLEMS MADE IT FOR YOU TO DO YOUR WORK, TAKE CARE OF THINGS AT HOME, OR GET ALONG WITH OTHER PEOPLE: SOMEWHAT DIFFICULT
7. FEELING AFRAID AS IF SOMETHING AWFUL MIGHT HAPPEN: SEVERAL DAYS
4. TROUBLE RELAXING: SEVERAL DAYS
5. BEING SO RESTLESS THAT IT IS HARD TO SIT STILL: SEVERAL DAYS

## 2024-07-17 ASSESSMENT — PATIENT HEALTH QUESTIONNAIRE - PHQ9
SUM OF ALL RESPONSES TO PHQ QUESTIONS 1-9: 0
SUM OF ALL RESPONSES TO PHQ QUESTIONS 1-9: 0
2. FEELING DOWN, DEPRESSED OR HOPELESS: NOT AT ALL
3. TROUBLE FALLING OR STAYING ASLEEP: NOT AT ALL
5. POOR APPETITE OR OVEREATING: NOT AT ALL
4. FEELING TIRED OR HAVING LITTLE ENERGY: NOT AT ALL
SUM OF ALL RESPONSES TO PHQ9 QUESTIONS 1 & 2: 0
10. IF YOU CHECKED OFF ANY PROBLEMS, HOW DIFFICULT HAVE THESE PROBLEMS MADE IT FOR YOU TO DO YOUR WORK, TAKE CARE OF THINGS AT HOME, OR GET ALONG WITH OTHER PEOPLE: NOT DIFFICULT AT ALL
6. FEELING BAD ABOUT YOURSELF - OR THAT YOU ARE A FAILURE OR HAVE LET YOURSELF OR YOUR FAMILY DOWN: NOT AT ALL
8. MOVING OR SPEAKING SO SLOWLY THAT OTHER PEOPLE COULD HAVE NOTICED. OR THE OPPOSITE, BEING SO FIGETY OR RESTLESS THAT YOU HAVE BEEN MOVING AROUND A LOT MORE THAN USUAL: NOT AT ALL
7. TROUBLE CONCENTRATING ON THINGS, SUCH AS READING THE NEWSPAPER OR WATCHING TELEVISION: NOT AT ALL
SUM OF ALL RESPONSES TO PHQ QUESTIONS 1-9: 0
SUM OF ALL RESPONSES TO PHQ QUESTIONS 1-9: 0
1. LITTLE INTEREST OR PLEASURE IN DOING THINGS: NOT AT ALL
9. THOUGHTS THAT YOU WOULD BE BETTER OFF DEAD, OR OF HURTING YOURSELF: NOT AT ALL

## 2024-07-17 ASSESSMENT — ENCOUNTER SYMPTOMS
CHEST TIGHTNESS: 0
RHINORRHEA: 0
DIARRHEA: 0
WHEEZING: 0
BACK PAIN: 0
BLOOD IN STOOL: 0
COUGH: 0
VOMITING: 0
SHORTNESS OF BREATH: 0
ABDOMINAL PAIN: 1
NAUSEA: 1

## 2024-07-17 NOTE — PROGRESS NOTES
Lokesh Fonseca is a 24 y.o. female presenting today for Follow-up  .     Chief Complaint   Patient presents with    Follow-up       HPI:  Lokesh Fonseca presents to the office today for follow up.     Patient has a PMHx significant for anxiety.      Patient is now 23 weeks pregnant.  States that she had hyperemesis gravidarum during her first trimester but now she is doing much better.  She no longer has to take an antiemetic.  She has slight reflux for which she is taking famotidine as needed.    Anxiety: Patient has tried Zoloft in the past but had no improvement.  She was subsequently switched to Lexapro. This has been working better for her.    Patient reports she had a conversation with her gynecologist and opted to continue on the Lexapro during pregnancy.     Recurrent tonsillar stones: Patient was referred to ENT.  Surgery was being scheduled but then patient canceled  due to her anxiety.       Review of Systems   Constitutional:  Negative for activity change, appetite change, chills, diaphoresis, fatigue, fever and unexpected weight change.   HENT:  Negative for congestion, nosebleeds, postnasal drip and rhinorrhea.    Respiratory:  Negative for cough, chest tightness, shortness of breath and wheezing.    Cardiovascular:  Negative for chest pain and leg swelling.   Gastrointestinal:  Positive for abdominal pain and nausea. Negative for blood in stool, diarrhea and vomiting.   Endocrine: Negative for polydipsia and polyphagia.   Genitourinary:  Negative for decreased urine volume, dysuria, frequency and pelvic pain.   Musculoskeletal:  Negative for back pain, myalgias and neck pain.   Neurological:  Negative for dizziness, tremors, seizures, syncope, speech difficulty, weakness, numbness and headaches.   Psychiatric/Behavioral:  Negative for agitation, confusion and suicidal ideas. The patient is nervous/anxious.    All other systems reviewed and are negative.        Allergies   Allergen Reactions    Penicillins

## 2024-07-17 NOTE — PROGRESS NOTES
\"Have you been to the ER, urgent care clinic since your last visit?  Hospitalized since your last visit?\"    YES - When: approximately 2 months ago.  Where and Why: Pregnancy fluids given [facility].    “Have you seen or consulted any other health care providers outside of [facility] since your last visit?”    YES - When: approximately 1 months ago.  Where and Why: [facility].     “Have you had a pap smear?”    YES - Where: 3 weeks ago OB/GYN Nurse/CMA to request most recent records if not in the chart    No cervical cancer screening on file             Click Here for Release of Records Request

## 2024-08-18 PROBLEM — O26.893 VAGINAL DISCHARGE IN PREGNANCY IN THIRD TRIMESTER: Status: ACTIVE | Noted: 2024-08-18

## 2024-08-18 PROBLEM — N89.8 VAGINAL DISCHARGE IN PREGNANCY IN THIRD TRIMESTER: Status: ACTIVE | Noted: 2024-08-18

## 2025-01-07 DIAGNOSIS — F32.A ANXIETY AND DEPRESSION: ICD-10-CM

## 2025-01-07 DIAGNOSIS — F41.9 ANXIETY AND DEPRESSION: ICD-10-CM

## 2025-01-09 DIAGNOSIS — F41.9 ANXIETY AND DEPRESSION: ICD-10-CM

## 2025-01-09 DIAGNOSIS — F32.A ANXIETY AND DEPRESSION: ICD-10-CM

## 2025-01-09 RX ORDER — ESCITALOPRAM OXALATE 10 MG/1
10 TABLET ORAL DAILY
Qty: 90 TABLET | Refills: 1 | Status: SHIPPED | OUTPATIENT
Start: 2025-01-09

## 2025-01-09 RX ORDER — ESCITALOPRAM OXALATE 10 MG/1
10 TABLET ORAL DAILY
Qty: 90 TABLET | Refills: 1 | OUTPATIENT
Start: 2025-01-09

## 2025-01-29 ENCOUNTER — TELEPHONE (OUTPATIENT)
Facility: CLINIC | Age: 25
End: 2025-01-29

## 2025-01-30 ENCOUNTER — OFFICE VISIT (OUTPATIENT)
Facility: CLINIC | Age: 25
End: 2025-01-30
Payer: OTHER GOVERNMENT

## 2025-01-30 VITALS
WEIGHT: 216 LBS | HEIGHT: 66 IN | RESPIRATION RATE: 18 BRPM | TEMPERATURE: 97.8 F | DIASTOLIC BLOOD PRESSURE: 80 MMHG | OXYGEN SATURATION: 96 % | BODY MASS INDEX: 34.72 KG/M2 | HEART RATE: 75 BPM | SYSTOLIC BLOOD PRESSURE: 120 MMHG

## 2025-01-30 DIAGNOSIS — F32.A ANXIETY AND DEPRESSION: Primary | ICD-10-CM

## 2025-01-30 DIAGNOSIS — R74.8 ELEVATED LIVER ENZYMES: ICD-10-CM

## 2025-01-30 DIAGNOSIS — Z87.59 HISTORY OF PRE-ECLAMPSIA: ICD-10-CM

## 2025-01-30 DIAGNOSIS — D64.9 ANEMIA, UNSPECIFIED TYPE: ICD-10-CM

## 2025-01-30 DIAGNOSIS — F41.9 ANXIETY AND DEPRESSION: Primary | ICD-10-CM

## 2025-01-30 DIAGNOSIS — F41.0 PANIC ATTACKS: ICD-10-CM

## 2025-01-30 DIAGNOSIS — Z13.6 SCREENING FOR CARDIOVASCULAR CONDITION: ICD-10-CM

## 2025-01-30 PROCEDURE — 99214 OFFICE O/P EST MOD 30 MIN: CPT | Performed by: STUDENT IN AN ORGANIZED HEALTH CARE EDUCATION/TRAINING PROGRAM

## 2025-01-30 SDOH — ECONOMIC STABILITY: FOOD INSECURITY: WITHIN THE PAST 12 MONTHS, YOU WORRIED THAT YOUR FOOD WOULD RUN OUT BEFORE YOU GOT MONEY TO BUY MORE.: NEVER TRUE

## 2025-01-30 SDOH — ECONOMIC STABILITY: FOOD INSECURITY: WITHIN THE PAST 12 MONTHS, THE FOOD YOU BOUGHT JUST DIDN'T LAST AND YOU DIDN'T HAVE MONEY TO GET MORE.: NEVER TRUE

## 2025-01-30 ASSESSMENT — PATIENT HEALTH QUESTIONNAIRE - PHQ9
9. THOUGHTS THAT YOU WOULD BE BETTER OFF DEAD, OR OF HURTING YOURSELF: NOT AT ALL
6. FEELING BAD ABOUT YOURSELF - OR THAT YOU ARE A FAILURE OR HAVE LET YOURSELF OR YOUR FAMILY DOWN: NOT AT ALL
7. TROUBLE CONCENTRATING ON THINGS, SUCH AS READING THE NEWSPAPER OR WATCHING TELEVISION: NOT AT ALL
4. FEELING TIRED OR HAVING LITTLE ENERGY: NOT AT ALL
10. IF YOU CHECKED OFF ANY PROBLEMS, HOW DIFFICULT HAVE THESE PROBLEMS MADE IT FOR YOU TO DO YOUR WORK, TAKE CARE OF THINGS AT HOME, OR GET ALONG WITH OTHER PEOPLE: NOT DIFFICULT AT ALL
8. MOVING OR SPEAKING SO SLOWLY THAT OTHER PEOPLE COULD HAVE NOTICED. OR THE OPPOSITE, BEING SO FIGETY OR RESTLESS THAT YOU HAVE BEEN MOVING AROUND A LOT MORE THAN USUAL: NOT AT ALL
SUM OF ALL RESPONSES TO PHQ QUESTIONS 1-9: 0
SUM OF ALL RESPONSES TO PHQ QUESTIONS 1-9: 0
3. TROUBLE FALLING OR STAYING ASLEEP: NOT AT ALL
5. POOR APPETITE OR OVEREATING: NOT AT ALL
SUM OF ALL RESPONSES TO PHQ QUESTIONS 1-9: 0
2. FEELING DOWN, DEPRESSED OR HOPELESS: NOT AT ALL
1. LITTLE INTEREST OR PLEASURE IN DOING THINGS: NOT AT ALL
SUM OF ALL RESPONSES TO PHQ QUESTIONS 1-9: 0
SUM OF ALL RESPONSES TO PHQ9 QUESTIONS 1 & 2: 0

## 2025-01-30 ASSESSMENT — ENCOUNTER SYMPTOMS
ABDOMINAL PAIN: 0
CHEST TIGHTNESS: 0
NAUSEA: 0
COUGH: 0
BLOOD IN STOOL: 0
DIARRHEA: 0
SHORTNESS OF BREATH: 0
BACK PAIN: 0
VOMITING: 0
RHINORRHEA: 0
WHEEZING: 0

## 2025-01-30 ASSESSMENT — ANXIETY QUESTIONNAIRES
2. NOT BEING ABLE TO STOP OR CONTROL WORRYING: SEVERAL DAYS
3. WORRYING TOO MUCH ABOUT DIFFERENT THINGS: SEVERAL DAYS
1. FEELING NERVOUS, ANXIOUS, OR ON EDGE: SEVERAL DAYS
7. FEELING AFRAID AS IF SOMETHING AWFUL MIGHT HAPPEN: NOT AT ALL
4. TROUBLE RELAXING: NOT AT ALL
6. BECOMING EASILY ANNOYED OR IRRITABLE: SEVERAL DAYS
IF YOU CHECKED OFF ANY PROBLEMS ON THIS QUESTIONNAIRE, HOW DIFFICULT HAVE THESE PROBLEMS MADE IT FOR YOU TO DO YOUR WORK, TAKE CARE OF THINGS AT HOME, OR GET ALONG WITH OTHER PEOPLE: NOT DIFFICULT AT ALL
5. BEING SO RESTLESS THAT IT IS HARD TO SIT STILL: NOT AT ALL
GAD7 TOTAL SCORE: 4

## 2025-01-30 NOTE — PROGRESS NOTES
Lokesh Fonseca is a 24 y.o. female presenting today for Follow-up  .     Chief Complaint   Patient presents with    Follow-up       HPI:  Lokesh Fonseca presents to the office today for follow up.     Patient has a PMHx significant for anxiety.      Patient had preeclampsia and is s/p  in 10/2024.    Labs in 10/2020 showed anemia with hemoglobin 8.3, low iron levels, elevated liver enzymes.    Patient is no longer on nifedipine.  Denies any fatigue, lightheadedness.    Anxiety: Stable on Lexapro.     Recurrent tonsillar stones: Patient was referred to ENT.  Surgery was being scheduled but then patient canceled  due to her anxiety.       Review of Systems   Constitutional:  Negative for activity change, appetite change, chills, diaphoresis, fatigue, fever and unexpected weight change.   HENT:  Negative for congestion, nosebleeds, postnasal drip and rhinorrhea.    Respiratory:  Negative for cough, chest tightness, shortness of breath and wheezing.    Cardiovascular:  Negative for chest pain and leg swelling.   Gastrointestinal:  Negative for abdominal pain, blood in stool, diarrhea, nausea and vomiting.   Endocrine: Negative for polydipsia and polyphagia.   Genitourinary:  Negative for decreased urine volume, dysuria, frequency and pelvic pain.   Musculoskeletal:  Negative for back pain, myalgias and neck pain.   Neurological:  Negative for dizziness, tremors, seizures, syncope, speech difficulty, weakness, numbness and headaches.   Psychiatric/Behavioral:  Negative for agitation, confusion and suicidal ideas. The patient is nervous/anxious.    All other systems reviewed and are negative.        Allergies   Allergen Reactions    Penicillins Rash    Amoxicillin Hives    Morphine Other (See Comments)     Denies allergy      Nsaids Other (See Comments) and Rash     bladder pain         PHQ Screening   PHQ-9 Total Score: 0 (2025 10:00 AM)  Thoughts that you would be better off dead, or of hurting yourself in

## 2025-06-17 ENCOUNTER — OFFICE VISIT (OUTPATIENT)
Facility: CLINIC | Age: 25
End: 2025-06-17
Payer: OTHER GOVERNMENT

## 2025-06-17 VITALS
DIASTOLIC BLOOD PRESSURE: 87 MMHG | SYSTOLIC BLOOD PRESSURE: 127 MMHG | HEIGHT: 66 IN | BODY MASS INDEX: 34.23 KG/M2 | OXYGEN SATURATION: 99 % | HEART RATE: 79 BPM | WEIGHT: 213 LBS

## 2025-06-17 DIAGNOSIS — E66.811 CLASS 1 OBESITY WITHOUT SERIOUS COMORBIDITY WITH BODY MASS INDEX (BMI) OF 34.0 TO 34.9 IN ADULT, UNSPECIFIED OBESITY TYPE: ICD-10-CM

## 2025-06-17 DIAGNOSIS — E28.2 PCOS (POLYCYSTIC OVARIAN SYNDROME): Primary | ICD-10-CM

## 2025-06-17 DIAGNOSIS — Z87.59 HISTORY OF PRE-ECLAMPSIA: ICD-10-CM

## 2025-06-17 DIAGNOSIS — F41.9 ANXIETY AND DEPRESSION: ICD-10-CM

## 2025-06-17 DIAGNOSIS — R74.8 ELEVATED LIVER ENZYMES: ICD-10-CM

## 2025-06-17 DIAGNOSIS — F32.A ANXIETY AND DEPRESSION: ICD-10-CM

## 2025-06-17 PROCEDURE — 99214 OFFICE O/P EST MOD 30 MIN: CPT | Performed by: STUDENT IN AN ORGANIZED HEALTH CARE EDUCATION/TRAINING PROGRAM

## 2025-06-17 SDOH — ECONOMIC STABILITY: FOOD INSECURITY: WITHIN THE PAST 12 MONTHS, YOU WORRIED THAT YOUR FOOD WOULD RUN OUT BEFORE YOU GOT MONEY TO BUY MORE.: NEVER TRUE

## 2025-06-17 SDOH — ECONOMIC STABILITY: FOOD INSECURITY: WITHIN THE PAST 12 MONTHS, THE FOOD YOU BOUGHT JUST DIDN'T LAST AND YOU DIDN'T HAVE MONEY TO GET MORE.: NEVER TRUE

## 2025-06-17 ASSESSMENT — ENCOUNTER SYMPTOMS
DIARRHEA: 0
ABDOMINAL PAIN: 0
NAUSEA: 0
WHEEZING: 0
BLOOD IN STOOL: 0
RHINORRHEA: 0
VOMITING: 0
SHORTNESS OF BREATH: 0
CHEST TIGHTNESS: 0
BACK PAIN: 0
COUGH: 0

## 2025-06-17 ASSESSMENT — PATIENT HEALTH QUESTIONNAIRE - PHQ9
SUM OF ALL RESPONSES TO PHQ QUESTIONS 1-9: 0
SUM OF ALL RESPONSES TO PHQ QUESTIONS 1-9: 0
2. FEELING DOWN, DEPRESSED OR HOPELESS: NOT AT ALL
SUM OF ALL RESPONSES TO PHQ QUESTIONS 1-9: 0
1. LITTLE INTEREST OR PLEASURE IN DOING THINGS: NOT AT ALL
SUM OF ALL RESPONSES TO PHQ QUESTIONS 1-9: 0

## 2025-06-17 NOTE — PROGRESS NOTES
Lokesh Fonseca is a 25 y.o. year old female who presents today for No chief complaint on file.      Is someone accompanying this pt? NO    Is the patient using any DME equipment during OV? NO    Depression Screenin/17/2025     8:38 AM 2025    10:00 AM 2024     4:28 PM 1/10/2024     4:41 PM 2023     9:01 AM 2023     9:43 AM 2022    10:31 AM   PHQ-9 Questionaire   Little interest or pleasure in doing things 0 0 0 0 1 2 2   Feeling down, depressed, or hopeless 0 0 0 0 1 2 2   Trouble falling or staying asleep, or sleeping too much  0 0 0 1 2 2   Feeling tired or having little energy  0 0 0 1 2 2   Poor appetite or overeating  0 0 0 1 2 2   Feeling bad about yourself - or that you are a failure or have let yourself or your family down  0 0 0 1 0 0   Trouble concentrating on things, such as reading the newspaper or watching television  0 0 0 1 2 0   Moving or speaking so slowly that other people could have noticed. Or the opposite - being so fidgety or restless that you have been moving around a lot more than usual  0 0 0 1 0 0   Thoughts that you would be better off dead, or of hurting yourself in some way  0 0 0 1     PHQ-9 Total Score 0 0 0 0 9 12 10   If you checked off any problems, how difficult have these problems made it for you to do your work, take care of things at home, or get along with other people?  0 0 0 1         Abuse Screening:       No data to display                Learning Assessment:  No question data found.    Fall Risk:       No data to display                    Coordination of Care:   1. \"Have you been to the ER, urgent care clinic since your last visit?  Hospitalized since your last visit?\" NO    2. \"Have you seen or consulted any other health care providers outside of the [facility] since your last visit?\" NO    3. For patients aged 45-75: Has the patient had a colonoscopy / FIT/ Cologuard? N/A    If the patient is female:    4. For patients

## 2025-06-17 NOTE — PROGRESS NOTES
Lokesh Fonseca is a 25 y.o. female presenting today for Other (Patient presents to discuss PCOS diagnosis and treatment.  )  .     Chief Complaint   Patient presents with    Other     Patient presents to discuss PCOS diagnosis and treatment.         HPI:  Lokesh Fonseca presents to the office today for follow up.     Patient has a PMHx significant for anxiety.      Patient had preeclampsia and is s/p  in 10/2024.    Labs in 10/2024 showed anemia with hemoglobin 8.3, low iron levels, elevated liver enzymes.  Repeat labs are still pending    Patient is no longer on nifedipine.  Denies any fatigue, lightheadedness.    Anxiety: Stable on Lexapro.     Recurrent tonsillar stones: Patient was referred to ENT.  Surgery was being scheduled but then patient canceled  due to her anxiety.    Patient reports a history of PCOS.  States that she was on OCPs previously but does not want to take them now as she has been thinking of having another baby.  She has been experiencing difficulty losing weight.  States that despite eating healthier and cutting down on carbs -she has only lost 2 pounds.  She is interested in medication to treat PCOS other than OCPs.  Patient reports a history of irregular menstrual cycle, hirsutism-reports she has to shave frequently.  She also reports a history of ovarian cysts.       Review of Systems   Constitutional:  Negative for activity change, appetite change, chills, diaphoresis, fatigue, fever and unexpected weight change.   HENT:  Negative for congestion, nosebleeds, postnasal drip and rhinorrhea.    Respiratory:  Negative for cough, chest tightness, shortness of breath and wheezing.    Cardiovascular:  Negative for chest pain and leg swelling.   Gastrointestinal:  Negative for abdominal pain, blood in stool, diarrhea, nausea and vomiting.   Endocrine: Negative for polydipsia and polyphagia.   Genitourinary:  Negative for decreased urine volume, dysuria, frequency and pelvic pain.

## 2025-06-18 ENCOUNTER — HOSPITAL ENCOUNTER (OUTPATIENT)
Facility: HOSPITAL | Age: 25
Setting detail: SPECIMEN
Discharge: HOME | End: 2025-06-21
Payer: OTHER GOVERNMENT

## 2025-06-18 DIAGNOSIS — D64.9 ANEMIA, UNSPECIFIED TYPE: ICD-10-CM

## 2025-06-18 DIAGNOSIS — R74.8 ELEVATED LIVER ENZYMES: ICD-10-CM

## 2025-06-18 DIAGNOSIS — E66.811 CLASS 1 OBESITY WITHOUT SERIOUS COMORBIDITY WITH BODY MASS INDEX (BMI) OF 34.0 TO 34.9 IN ADULT, UNSPECIFIED OBESITY TYPE: ICD-10-CM

## 2025-06-18 DIAGNOSIS — E28.2 PCOS (POLYCYSTIC OVARIAN SYNDROME): ICD-10-CM

## 2025-06-18 DIAGNOSIS — Z13.6 SCREENING FOR CARDIOVASCULAR CONDITION: ICD-10-CM

## 2025-06-18 LAB
ALBUMIN SERPL-MCNC: 3.5 G/DL (ref 3.4–5)
ALBUMIN/GLOB SERPL: 1 (ref 0.8–1.7)
ALP SERPL-CCNC: 101 U/L (ref 45–117)
ALT SERPL-CCNC: 23 U/L (ref 10–35)
ANION GAP SERPL CALC-SCNC: 11 MMOL/L (ref 3–18)
AST SERPL-CCNC: 23 U/L (ref 10–38)
BASOPHILS # BLD: 0.04 K/UL (ref 0–0.1)
BASOPHILS NFR BLD: 0.5 % (ref 0–2)
BILIRUB SERPL-MCNC: 0.2 MG/DL (ref 0.2–1)
BUN SERPL-MCNC: 12 MG/DL (ref 6–23)
BUN/CREAT SERPL: 16 (ref 12–20)
CALCIUM SERPL-MCNC: 9.5 MG/DL (ref 8.5–10.1)
CHLORIDE SERPL-SCNC: 104 MMOL/L (ref 98–107)
CHOLEST SERPL-MCNC: 183 MG/DL
CO2 SERPL-SCNC: 23 MMOL/L (ref 21–32)
CREAT SERPL-MCNC: 0.76 MG/DL (ref 0.6–1.3)
DIFFERENTIAL METHOD BLD: ABNORMAL
EOSINOPHIL # BLD: 0.15 K/UL (ref 0–0.4)
EOSINOPHIL NFR BLD: 1.8 % (ref 0–5)
ERYTHROCYTE [DISTWIDTH] IN BLOOD BY AUTOMATED COUNT: 17.2 % (ref 11.6–14.5)
EST. AVERAGE GLUCOSE BLD GHB EST-MCNC: 116 MG/DL
FERRITIN SERPL-MCNC: 8 NG/ML (ref 13–400)
FOLATE SERPL-MCNC: 9.8 NG/ML (ref 4.6–34.8)
GLOBULIN SER CALC-MCNC: 3.4 G/DL (ref 2–4)
GLUCOSE SERPL-MCNC: 104 MG/DL (ref 74–108)
HBA1C MFR BLD: 5.7 % (ref 4.2–5.6)
HCT VFR BLD AUTO: 35.5 % (ref 35–45)
HDLC SERPL-MCNC: 44 MG/DL (ref 40–60)
HDLC SERPL: 4.1 (ref 0–5)
HGB BLD-MCNC: 10.3 G/DL (ref 12–16)
IMM GRANULOCYTES # BLD AUTO: 0.03 K/UL (ref 0–0.04)
IMM GRANULOCYTES NFR BLD AUTO: 0.4 % (ref 0–0.5)
IRON SATN MFR SERPL: 6 %
IRON SERPL-MCNC: 23 UG/DL (ref 50–175)
LDLC SERPL CALC-MCNC: 110 MG/DL (ref 0–100)
LYMPHOCYTES # BLD: 2.27 K/UL (ref 0.9–3.6)
LYMPHOCYTES NFR BLD: 27.1 % (ref 21–52)
MCH RBC QN AUTO: 22.4 PG (ref 24–34)
MCHC RBC AUTO-ENTMCNC: 29 G/DL (ref 31–37)
MCV RBC AUTO: 77.2 FL (ref 78–100)
MONOCYTES # BLD: 0.55 K/UL (ref 0.05–1.2)
MONOCYTES NFR BLD: 6.6 % (ref 3–10)
NEUTS SEG # BLD: 5.34 K/UL (ref 1.8–8)
NEUTS SEG NFR BLD: 63.6 % (ref 40–73)
NRBC # BLD: 0 K/UL (ref 0–0.01)
NRBC BLD-RTO: 0 PER 100 WBC
PLATELET # BLD AUTO: 367 K/UL (ref 135–420)
PMV BLD AUTO: 12.1 FL (ref 9.2–11.8)
POTASSIUM SERPL-SCNC: 4.7 MMOL/L (ref 3.5–5.5)
PROT SERPL-MCNC: 7 G/DL (ref 6.4–8.2)
RBC # BLD AUTO: 4.6 M/UL (ref 4.2–5.3)
SODIUM SERPL-SCNC: 138 MMOL/L (ref 136–145)
TIBC SERPL-MCNC: 364 UG/DL (ref 250–450)
TRIGL SERPL-MCNC: 143 MG/DL (ref 0–150)
TSH, 3RD GENERATION: 2.49 UIU/ML (ref 0.27–4.2)
UIBC SERPL-MCNC: 341 UG/DL (ref 112–347)
VIT B12 SERPL-MCNC: 311 PG/ML (ref 211–911)
VLDLC SERPL CALC-MCNC: 29 MG/DL
WBC # BLD AUTO: 8.4 K/UL (ref 4.6–13.2)

## 2025-06-18 PROCEDURE — 36415 COLL VENOUS BLD VENIPUNCTURE: CPT

## 2025-06-18 PROCEDURE — 84410 TESTOSTERONE BIOAVAILABLE: CPT

## 2025-06-18 PROCEDURE — 83036 HEMOGLOBIN GLYCOSYLATED A1C: CPT

## 2025-06-18 PROCEDURE — 82746 ASSAY OF FOLIC ACID SERUM: CPT

## 2025-06-18 PROCEDURE — 82728 ASSAY OF FERRITIN: CPT

## 2025-06-18 PROCEDURE — 82607 VITAMIN B-12: CPT

## 2025-06-18 PROCEDURE — 84443 ASSAY THYROID STIM HORMONE: CPT

## 2025-06-18 PROCEDURE — 83540 ASSAY OF IRON: CPT

## 2025-06-18 PROCEDURE — 80061 LIPID PANEL: CPT

## 2025-06-18 PROCEDURE — 80053 COMPREHEN METABOLIC PANEL: CPT

## 2025-06-18 PROCEDURE — 83550 IRON BINDING TEST: CPT

## 2025-06-18 PROCEDURE — 84270 ASSAY OF SEX HORMONE GLOBUL: CPT

## 2025-06-18 PROCEDURE — 85025 COMPLETE CBC W/AUTO DIFF WBC: CPT

## 2025-06-20 LAB
SHBG SERPL-SCNC: 22.2 NMOL/L (ref 24.6–122)
TESTOST FREE MFR SERPL: ABNORMAL NG/DL
TESTOST SERPL-MCNC: 60 NG/DL (ref 13–71)
TESTOSTERONE.FREE+WB MFR SERPL: ABNORMAL %

## 2025-06-23 LAB
SHBG SERPL-SCNC: 22.2 NMOL/L (ref 24.6–122)
TESTOST FREE MFR SERPL: 16.3 NG/DL (ref 0–9.5)
TESTOST SERPL-MCNC: 60 NG/DL (ref 13–71)
TESTOSTERONE.FREE+WB MFR SERPL: 27.1 % (ref 3–18)

## 2025-07-02 ENCOUNTER — OFFICE VISIT (OUTPATIENT)
Facility: CLINIC | Age: 25
End: 2025-07-02
Payer: OTHER GOVERNMENT

## 2025-07-02 VITALS
OXYGEN SATURATION: 100 % | BODY MASS INDEX: 34.23 KG/M2 | SYSTOLIC BLOOD PRESSURE: 128 MMHG | HEART RATE: 71 BPM | DIASTOLIC BLOOD PRESSURE: 84 MMHG | WEIGHT: 213 LBS | HEIGHT: 66 IN

## 2025-07-02 DIAGNOSIS — R73.03 PREDIABETES: ICD-10-CM

## 2025-07-02 DIAGNOSIS — F32.A ANXIETY AND DEPRESSION: ICD-10-CM

## 2025-07-02 DIAGNOSIS — F41.9 ANXIETY AND DEPRESSION: ICD-10-CM

## 2025-07-02 DIAGNOSIS — D50.9 IRON DEFICIENCY ANEMIA, UNSPECIFIED IRON DEFICIENCY ANEMIA TYPE: Primary | ICD-10-CM

## 2025-07-02 DIAGNOSIS — Z87.59 HISTORY OF PRE-ECLAMPSIA: ICD-10-CM

## 2025-07-02 DIAGNOSIS — E28.2 PCOS (POLYCYSTIC OVARIAN SYNDROME): ICD-10-CM

## 2025-07-02 PROCEDURE — G2211 COMPLEX E/M VISIT ADD ON: HCPCS | Performed by: STUDENT IN AN ORGANIZED HEALTH CARE EDUCATION/TRAINING PROGRAM

## 2025-07-02 PROCEDURE — 99214 OFFICE O/P EST MOD 30 MIN: CPT | Performed by: STUDENT IN AN ORGANIZED HEALTH CARE EDUCATION/TRAINING PROGRAM

## 2025-07-02 RX ORDER — ALPRAZOLAM 0.25 MG
0.25 TABLET ORAL NIGHTLY PRN
COMMUNITY

## 2025-07-02 RX ORDER — ESCITALOPRAM OXALATE 10 MG/1
10 TABLET ORAL DAILY
Qty: 90 TABLET | Refills: 1 | Status: SHIPPED | OUTPATIENT
Start: 2025-07-02

## 2025-07-02 RX ORDER — METFORMIN HYDROCHLORIDE 500 MG/1
500 TABLET, EXTENDED RELEASE ORAL
Qty: 90 TABLET | Refills: 1 | Status: SHIPPED | OUTPATIENT
Start: 2025-07-02

## 2025-07-02 RX ORDER — FERROUS SULFATE 325(65) MG
325 TABLET ORAL DAILY
COMMUNITY
Start: 2024-10-14

## 2025-07-02 SDOH — ECONOMIC STABILITY: FOOD INSECURITY: WITHIN THE PAST 12 MONTHS, YOU WORRIED THAT YOUR FOOD WOULD RUN OUT BEFORE YOU GOT MONEY TO BUY MORE.: NEVER TRUE

## 2025-07-02 SDOH — ECONOMIC STABILITY: FOOD INSECURITY: WITHIN THE PAST 12 MONTHS, THE FOOD YOU BOUGHT JUST DIDN'T LAST AND YOU DIDN'T HAVE MONEY TO GET MORE.: NEVER TRUE

## 2025-07-02 ASSESSMENT — PATIENT HEALTH QUESTIONNAIRE - PHQ9
1. LITTLE INTEREST OR PLEASURE IN DOING THINGS: NOT AT ALL
SUM OF ALL RESPONSES TO PHQ QUESTIONS 1-9: 0
2. FEELING DOWN, DEPRESSED OR HOPELESS: NOT AT ALL

## 2025-07-02 ASSESSMENT — ENCOUNTER SYMPTOMS
CHEST TIGHTNESS: 0
COUGH: 0
WHEEZING: 0
BACK PAIN: 0
NAUSEA: 1
VOMITING: 1
DIARRHEA: 0
SHORTNESS OF BREATH: 0
BLOOD IN STOOL: 0
ABDOMINAL PAIN: 0
RHINORRHEA: 0

## 2025-07-02 NOTE — PROGRESS NOTES
Lokesh Fonseca is a 25 y.o. female presenting today for Other (Patient presents with complaints of vomiting, fatigued, blurry vision.  Patient reports low level iron results. )  .     Chief Complaint   Patient presents with    Other     Patient presents with complaints of vomiting, fatigued, blurry vision.  Patient reports low level iron results.        HPI:  Lokesh Fonseca presents to the office today for follow up.     Patient has a PMHx significant for anxiety, iron deficiency, HLD, PCOS.      Patient had preeclampsia and is s/p  in 10/2024.    HLD: Lipid panel showed  and 2025    Anxiety: Stable on Lexapro.     Recurrent tonsillar stones: Patient was referred to ENT.  Surgery was being scheduled but then patient canceled  due to her anxiety.    Patient reports a history of PCOS.  States that she was on OCPs previously but does not want to take them now as she has been thinking of having another baby.  She has been experiencing difficulty losing weight.  States that despite eating healthier and cutting down on carbs -she has only lost 2 pounds.  She is interested in medication to treat PCOS other than OCPs.  Patient reports a history of irregular menstrual cycle, hirsutism-reports she has to shave frequently.  She also reports a history of ovarian cysts.    Labs notable for anemia with hemoglobin 10.3 with ferritin 8.  Patient is complaining of increased fatigue, lightheadedness, cravings for ice cold drinks  HbA1c was 5.7%     Patient started taking oral iron supplements when she saw her labs but she started having nausea and vomiting with the supplements.  States that she had great difficulty taking the iron supplements during her pregnancy as well due to the GI side effects.    Review of Systems   Constitutional:  Positive for fatigue. Negative for activity change, appetite change, chills, diaphoresis, fever and unexpected weight change.   HENT:  Negative for congestion, nosebleeds, postnasal

## (undated) DEVICE — AIRLIFE™ NASAL OXYGEN CANNULA CURVED, FLARED TIP WITH 14 FOOT (4.3 M) CRUSH-RESISTANT TUBING, OVER-THE-EAR STYLE: Brand: AIRLIFE™